# Patient Record
Sex: FEMALE | Race: WHITE | Employment: UNEMPLOYED | URBAN - METROPOLITAN AREA
[De-identification: names, ages, dates, MRNs, and addresses within clinical notes are randomized per-mention and may not be internally consistent; named-entity substitution may affect disease eponyms.]

---

## 2022-05-23 ENCOUNTER — OFFICE VISIT (OUTPATIENT)
Dept: FAMILY MEDICINE CLINIC | Facility: CLINIC | Age: 22
End: 2022-05-23
Payer: COMMERCIAL

## 2022-05-23 VITALS
RESPIRATION RATE: 20 BRPM | HEART RATE: 92 BPM | DIASTOLIC BLOOD PRESSURE: 68 MMHG | WEIGHT: 160 LBS | SYSTOLIC BLOOD PRESSURE: 110 MMHG | HEIGHT: 65 IN | TEMPERATURE: 98 F | BODY MASS INDEX: 26.66 KG/M2

## 2022-05-23 DIAGNOSIS — Z00.00 LABORATORY EXAM ORDERED AS PART OF ROUTINE GENERAL MEDICAL EXAMINATION: ICD-10-CM

## 2022-05-23 DIAGNOSIS — E55.9 VITAMIN D DEFICIENCY: ICD-10-CM

## 2022-05-23 DIAGNOSIS — K08.89 TOOTH PAIN: Primary | ICD-10-CM

## 2022-05-23 DIAGNOSIS — M26.609 TMJ (TEMPOROMANDIBULAR JOINT DISORDER): ICD-10-CM

## 2022-05-23 DIAGNOSIS — Z13.29 SCREENING FOR THYROID DISORDER: ICD-10-CM

## 2022-05-23 PROCEDURE — 99204 OFFICE O/P NEW MOD 45 MIN: CPT | Performed by: STUDENT IN AN ORGANIZED HEALTH CARE EDUCATION/TRAINING PROGRAM

## 2022-05-23 PROCEDURE — 3074F SYST BP LT 130 MM HG: CPT | Performed by: STUDENT IN AN ORGANIZED HEALTH CARE EDUCATION/TRAINING PROGRAM

## 2022-05-23 PROCEDURE — 3008F BODY MASS INDEX DOCD: CPT | Performed by: STUDENT IN AN ORGANIZED HEALTH CARE EDUCATION/TRAINING PROGRAM

## 2022-05-23 PROCEDURE — 3078F DIAST BP <80 MM HG: CPT | Performed by: STUDENT IN AN ORGANIZED HEALTH CARE EDUCATION/TRAINING PROGRAM

## 2022-05-24 ENCOUNTER — LAB ENCOUNTER (OUTPATIENT)
Dept: LAB | Age: 22
End: 2022-05-24
Attending: STUDENT IN AN ORGANIZED HEALTH CARE EDUCATION/TRAINING PROGRAM
Payer: COMMERCIAL

## 2022-05-24 LAB
ALBUMIN SERPL-MCNC: 3.2 G/DL (ref 3.4–5)
ALBUMIN/GLOB SERPL: 0.7 {RATIO} (ref 1–2)
ALP LIVER SERPL-CCNC: 73 U/L
ALT SERPL-CCNC: 13 U/L
ANION GAP SERPL CALC-SCNC: 9 MMOL/L (ref 0–18)
AST SERPL-CCNC: 16 U/L (ref 15–37)
BASOPHILS # BLD AUTO: 0.03 X10(3) UL (ref 0–0.2)
BASOPHILS NFR BLD AUTO: 0.2 %
BILIRUB SERPL-MCNC: 0.3 MG/DL (ref 0.1–2)
BILIRUB UR QL STRIP.AUTO: NEGATIVE
BUN BLD-MCNC: 13 MG/DL (ref 7–18)
CALCIUM BLD-MCNC: 9.3 MG/DL (ref 8.5–10.1)
CHLORIDE SERPL-SCNC: 105 MMOL/L (ref 98–112)
CHOLEST SERPL-MCNC: 175 MG/DL (ref ?–200)
CO2 SERPL-SCNC: 24 MMOL/L (ref 21–32)
COLOR UR AUTO: YELLOW
CREAT BLD-MCNC: 0.76 MG/DL
EOSINOPHIL # BLD AUTO: 0.16 X10(3) UL (ref 0–0.7)
EOSINOPHIL NFR BLD AUTO: 1.3 %
ERYTHROCYTE [DISTWIDTH] IN BLOOD BY AUTOMATED COUNT: 15.2 %
FASTING PATIENT LIPID ANSWER: YES
FASTING STATUS PATIENT QL REPORTED: YES
GLOBULIN PLAS-MCNC: 4.5 G/DL (ref 2.8–4.4)
GLUCOSE BLD-MCNC: 75 MG/DL (ref 70–99)
GLUCOSE UR STRIP.AUTO-MCNC: NEGATIVE MG/DL
HCT VFR BLD AUTO: 38 %
HDLC SERPL-MCNC: 49 MG/DL (ref 40–59)
HGB BLD-MCNC: 11.8 G/DL
IMM GRANULOCYTES # BLD AUTO: 0.08 X10(3) UL (ref 0–1)
IMM GRANULOCYTES NFR BLD: 0.6 %
KETONES UR STRIP.AUTO-MCNC: 20 MG/DL
LDLC SERPL CALC-MCNC: 108 MG/DL (ref ?–100)
LYMPHOCYTES # BLD AUTO: 3.32 X10(3) UL (ref 1.5–5)
LYMPHOCYTES NFR BLD AUTO: 26.3 %
MCH RBC QN AUTO: 26.4 PG (ref 26–34)
MCHC RBC AUTO-ENTMCNC: 31.1 G/DL (ref 31–37)
MCV RBC AUTO: 85 FL
MONOCYTES # BLD AUTO: 0.64 X10(3) UL (ref 0.1–1)
MONOCYTES NFR BLD AUTO: 5.1 %
NEUTROPHILS # BLD AUTO: 8.4 X10 (3) UL (ref 1.5–7.7)
NEUTROPHILS # BLD AUTO: 8.4 X10(3) UL (ref 1.5–7.7)
NEUTROPHILS NFR BLD AUTO: 66.5 %
NITRITE UR QL STRIP.AUTO: NEGATIVE
NONHDLC SERPL-MCNC: 126 MG/DL (ref ?–130)
OSMOLALITY SERPL CALC.SUM OF ELEC: 285 MOSM/KG (ref 275–295)
PH UR STRIP.AUTO: 5 [PH] (ref 5–8)
PLATELET # BLD AUTO: 449 10(3)UL (ref 150–450)
POTASSIUM SERPL-SCNC: 4 MMOL/L (ref 3.5–5.1)
PROT SERPL-MCNC: 7.7 G/DL (ref 6.4–8.2)
PROT UR STRIP.AUTO-MCNC: 30 MG/DL
RBC # BLD AUTO: 4.47 X10(6)UL
SODIUM SERPL-SCNC: 138 MMOL/L (ref 136–145)
SP GR UR STRIP.AUTO: 1.02 (ref 1–1.03)
TRIGL SERPL-MCNC: 97 MG/DL (ref 30–149)
TSI SER-ACNC: 0.87 MIU/ML (ref 0.36–3.74)
UROBILINOGEN UR STRIP.AUTO-MCNC: <2 MG/DL
VIT D+METAB SERPL-MCNC: 29.9 NG/ML (ref 30–100)
VLDLC SERPL CALC-MCNC: 16 MG/DL (ref 0–30)
WBC # BLD AUTO: 12.6 X10(3) UL (ref 4–11)

## 2022-05-24 PROCEDURE — 87086 URINE CULTURE/COLONY COUNT: CPT | Performed by: STUDENT IN AN ORGANIZED HEALTH CARE EDUCATION/TRAINING PROGRAM

## 2022-05-25 DIAGNOSIS — R77.1 ELEVATED SERUM GLOBULIN LEVEL: ICD-10-CM

## 2022-05-25 DIAGNOSIS — D64.9 MILD ANEMIA: ICD-10-CM

## 2022-05-25 DIAGNOSIS — D72.829 LEUKOCYTOSIS, UNSPECIFIED TYPE: ICD-10-CM

## 2022-05-25 DIAGNOSIS — R77.0 LOW SERUM ALBUMIN: Primary | ICD-10-CM

## 2022-11-22 ENCOUNTER — OFFICE VISIT (OUTPATIENT)
Dept: FAMILY MEDICINE CLINIC | Facility: CLINIC | Age: 22
End: 2022-11-22
Payer: COMMERCIAL

## 2022-11-22 ENCOUNTER — PATIENT MESSAGE (OUTPATIENT)
Dept: FAMILY MEDICINE CLINIC | Facility: CLINIC | Age: 22
End: 2022-11-22

## 2022-11-22 VITALS
DIASTOLIC BLOOD PRESSURE: 70 MMHG | BODY MASS INDEX: 26.66 KG/M2 | OXYGEN SATURATION: 98 % | RESPIRATION RATE: 18 BRPM | SYSTOLIC BLOOD PRESSURE: 110 MMHG | TEMPERATURE: 98 F | HEART RATE: 100 BPM | WEIGHT: 160 LBS | HEIGHT: 65 IN

## 2022-11-22 DIAGNOSIS — J31.2 CHRONIC SORE THROAT: Primary | ICD-10-CM

## 2022-11-22 DIAGNOSIS — J02.9 SORE THROAT: Primary | ICD-10-CM

## 2022-11-22 LAB
CONTROL LINE PRESENT WITH A CLEAR BACKGROUND (YES/NO): YES YES/NO
KIT LOT #: NORMAL NUMERIC
STREP GRP A CUL-SCR: NEGATIVE

## 2022-11-22 PROCEDURE — 3078F DIAST BP <80 MM HG: CPT

## 2022-11-22 PROCEDURE — 99213 OFFICE O/P EST LOW 20 MIN: CPT

## 2022-11-22 PROCEDURE — 3008F BODY MASS INDEX DOCD: CPT

## 2022-11-22 PROCEDURE — 87637 SARSCOV2&INF A&B&RSV AMP PRB: CPT

## 2022-11-22 PROCEDURE — 3074F SYST BP LT 130 MM HG: CPT

## 2022-11-22 PROCEDURE — 87880 STREP A ASSAY W/OPTIC: CPT

## 2022-11-23 ENCOUNTER — LAB ENCOUNTER (OUTPATIENT)
Dept: LAB | Age: 22
End: 2022-11-23
Attending: FAMILY MEDICINE
Payer: COMMERCIAL

## 2022-11-23 DIAGNOSIS — J31.2 CHRONIC SORE THROAT: ICD-10-CM

## 2022-11-23 LAB
FLUAV + FLUBV RNA SPEC NAA+PROBE: NOT DETECTED
FLUAV + FLUBV RNA SPEC NAA+PROBE: NOT DETECTED
RSV RNA SPEC NAA+PROBE: NOT DETECTED
SARS-COV-2 RNA RESP QL NAA+PROBE: NOT DETECTED

## 2022-11-23 PROCEDURE — 86665 EPSTEIN-BARR CAPSID VCA: CPT

## 2022-11-23 PROCEDURE — 86664 EPSTEIN-BARR NUCLEAR ANTIGEN: CPT

## 2022-11-23 PROCEDURE — 36415 COLL VENOUS BLD VENIPUNCTURE: CPT

## 2022-11-23 NOTE — TELEPHONE ENCOUNTER
Call to pt-explained info noted below from dr schumacher. Pt confirms already had labs drawn this morning. Advised we will notify her once dr schumacher reviews results. Discussed our altered ofc hours due to holiday but we are in the ofc 11/25/22 morning. Pt also advised dr schumacher is out of ofc until 11/28/22. Patient voices understanding/agrees with plan/no further questions.

## 2022-11-23 NOTE — TELEPHONE ENCOUNTER
I ordered Georges-Barr virus titers. Please let her know that if the IgG is positive it indicates that she has had a past infection with mono and it will be difficult to tell if any of her current symptoms are related to it as typically IgM is converted to IgG within 4 weeks. If IgM is positive, that means that she does have an active monoinfection.

## 2022-11-25 LAB
EBV NA IGG SER QL IA: POSITIVE
EBV VCA IGG SER QL IA: POSITIVE
EBV VCA IGM SER QL IA: NEGATIVE

## 2022-11-25 RX ORDER — NORELGESTROMIN AND ETHINYL ESTRADIOL 150; 35 UG/D; UG/D
1 PATCH TRANSDERMAL WEEKLY
OUTPATIENT
Start: 2022-11-25

## 2023-01-11 ENCOUNTER — OFFICE VISIT (OUTPATIENT)
Dept: FAMILY MEDICINE CLINIC | Facility: CLINIC | Age: 23
End: 2023-01-11
Payer: COMMERCIAL

## 2023-01-11 ENCOUNTER — LAB ENCOUNTER (OUTPATIENT)
Dept: LAB | Age: 23
End: 2023-01-11
Attending: STUDENT IN AN ORGANIZED HEALTH CARE EDUCATION/TRAINING PROGRAM
Payer: COMMERCIAL

## 2023-01-11 VITALS
OXYGEN SATURATION: 98 % | HEART RATE: 75 BPM | SYSTOLIC BLOOD PRESSURE: 102 MMHG | TEMPERATURE: 97 F | HEIGHT: 65 IN | RESPIRATION RATE: 18 BRPM | DIASTOLIC BLOOD PRESSURE: 64 MMHG | BODY MASS INDEX: 27.32 KG/M2 | WEIGHT: 164 LBS

## 2023-01-11 DIAGNOSIS — L71.0 PERIORAL DERMATITIS: ICD-10-CM

## 2023-01-11 DIAGNOSIS — R53.82 CHRONIC FATIGUE: ICD-10-CM

## 2023-01-11 DIAGNOSIS — J30.1 SEASONAL ALLERGIC RHINITIS DUE TO POLLEN: ICD-10-CM

## 2023-01-11 DIAGNOSIS — E55.9 VITAMIN D INSUFFICIENCY: ICD-10-CM

## 2023-01-11 DIAGNOSIS — D64.9 MILD ANEMIA: ICD-10-CM

## 2023-01-11 DIAGNOSIS — R06.83 SNORING: ICD-10-CM

## 2023-01-11 DIAGNOSIS — G47.19 DAYTIME HYPERSOMNOLENCE: Primary | ICD-10-CM

## 2023-01-11 DIAGNOSIS — G47.19 DAYTIME HYPERSOMNOLENCE: ICD-10-CM

## 2023-01-11 LAB
ALBUMIN SERPL-MCNC: 3.9 G/DL (ref 3.4–5)
ALBUMIN/GLOB SERPL: 1 {RATIO} (ref 1–2)
ALP LIVER SERPL-CCNC: 90 U/L
ALT SERPL-CCNC: 15 U/L
ANION GAP SERPL CALC-SCNC: 7 MMOL/L (ref 0–18)
AST SERPL-CCNC: 14 U/L (ref 15–37)
BASOPHILS # BLD AUTO: 0.04 X10(3) UL (ref 0–0.2)
BASOPHILS NFR BLD AUTO: 0.5 %
BILIRUB SERPL-MCNC: 0.4 MG/DL (ref 0.1–2)
BUN BLD-MCNC: 21 MG/DL (ref 7–18)
BUN/CREAT SERPL: 27.6 (ref 10–20)
CALCIUM BLD-MCNC: 9.7 MG/DL (ref 8.5–10.1)
CHLORIDE SERPL-SCNC: 102 MMOL/L (ref 98–112)
CO2 SERPL-SCNC: 29 MMOL/L (ref 21–32)
CREAT BLD-MCNC: 0.76 MG/DL
DEPRECATED RDW RBC AUTO: 48.1 FL (ref 35.1–46.3)
EOSINOPHIL # BLD AUTO: 0.22 X10(3) UL (ref 0–0.7)
EOSINOPHIL NFR BLD AUTO: 2.6 %
ERYTHROCYTE [DISTWIDTH] IN BLOOD BY AUTOMATED COUNT: 15.4 % (ref 11–15)
FASTING STATUS PATIENT QL REPORTED: YES
GFR SERPLBLD BASED ON 1.73 SQ M-ARVRAT: 115 ML/MIN/1.73M2 (ref 60–?)
GLOBULIN PLAS-MCNC: 3.8 G/DL (ref 2.8–4.4)
GLUCOSE BLD-MCNC: 79 MG/DL (ref 70–99)
HCT VFR BLD AUTO: 38.4 %
HGB BLD-MCNC: 11.9 G/DL
IMM GRANULOCYTES # BLD AUTO: 0.02 X10(3) UL (ref 0–1)
IMM GRANULOCYTES NFR BLD: 0.2 %
LYMPHOCYTES # BLD AUTO: 3.08 X10(3) UL (ref 1–4)
LYMPHOCYTES NFR BLD AUTO: 37.1 %
MCH RBC QN AUTO: 26.4 PG (ref 26–34)
MCHC RBC AUTO-ENTMCNC: 31 G/DL (ref 31–37)
MCV RBC AUTO: 85.1 FL
MONOCYTES # BLD AUTO: 0.61 X10(3) UL (ref 0.1–1)
MONOCYTES NFR BLD AUTO: 7.3 %
NEUTROPHILS # BLD AUTO: 4.34 X10 (3) UL (ref 1.5–7.7)
NEUTROPHILS # BLD AUTO: 4.34 X10(3) UL (ref 1.5–7.7)
NEUTROPHILS NFR BLD AUTO: 52.3 %
OSMOLALITY SERPL CALC.SUM OF ELEC: 288 MOSM/KG (ref 275–295)
PLATELET # BLD AUTO: 447 10(3)UL (ref 150–450)
POTASSIUM SERPL-SCNC: 4.7 MMOL/L (ref 3.5–5.1)
PROT SERPL-MCNC: 7.7 G/DL (ref 6.4–8.2)
RBC # BLD AUTO: 4.51 X10(6)UL
SODIUM SERPL-SCNC: 138 MMOL/L (ref 136–145)
T4 FREE SERPL-MCNC: 1.4 NG/DL (ref 0.8–1.7)
TSI SER-ACNC: 1.07 MIU/ML (ref 0.36–3.74)
VIT D+METAB SERPL-MCNC: 45.2 NG/ML (ref 30–100)
WBC # BLD AUTO: 8.3 X10(3) UL (ref 4–11)

## 2023-01-11 PROCEDURE — 85025 COMPLETE CBC W/AUTO DIFF WBC: CPT

## 2023-01-11 PROCEDURE — 84443 ASSAY THYROID STIM HORMONE: CPT

## 2023-01-11 PROCEDURE — 3078F DIAST BP <80 MM HG: CPT | Performed by: STUDENT IN AN ORGANIZED HEALTH CARE EDUCATION/TRAINING PROGRAM

## 2023-01-11 PROCEDURE — 80053 COMPREHEN METABOLIC PANEL: CPT

## 2023-01-11 PROCEDURE — 82306 VITAMIN D 25 HYDROXY: CPT

## 2023-01-11 PROCEDURE — 84439 ASSAY OF FREE THYROXINE: CPT

## 2023-01-11 PROCEDURE — 99214 OFFICE O/P EST MOD 30 MIN: CPT | Performed by: STUDENT IN AN ORGANIZED HEALTH CARE EDUCATION/TRAINING PROGRAM

## 2023-01-11 PROCEDURE — 3008F BODY MASS INDEX DOCD: CPT | Performed by: STUDENT IN AN ORGANIZED HEALTH CARE EDUCATION/TRAINING PROGRAM

## 2023-01-11 PROCEDURE — 3074F SYST BP LT 130 MM HG: CPT | Performed by: STUDENT IN AN ORGANIZED HEALTH CARE EDUCATION/TRAINING PROGRAM

## 2023-03-15 ENCOUNTER — OFFICE VISIT (OUTPATIENT)
Dept: FAMILY MEDICINE CLINIC | Facility: CLINIC | Age: 23
End: 2023-03-15
Payer: COMMERCIAL

## 2023-03-15 VITALS
HEIGHT: 65 IN | WEIGHT: 160 LBS | TEMPERATURE: 100 F | BODY MASS INDEX: 26.66 KG/M2 | OXYGEN SATURATION: 99 % | HEART RATE: 85 BPM | RESPIRATION RATE: 18 BRPM | SYSTOLIC BLOOD PRESSURE: 114 MMHG | DIASTOLIC BLOOD PRESSURE: 72 MMHG

## 2023-03-15 DIAGNOSIS — J06.9 VIRAL URI WITH COUGH: Primary | ICD-10-CM

## 2023-03-15 DIAGNOSIS — J02.0 STREP THROAT: ICD-10-CM

## 2023-03-15 PROCEDURE — 3078F DIAST BP <80 MM HG: CPT | Performed by: NURSE PRACTITIONER

## 2023-03-15 PROCEDURE — 99213 OFFICE O/P EST LOW 20 MIN: CPT | Performed by: NURSE PRACTITIONER

## 2023-03-15 PROCEDURE — 3074F SYST BP LT 130 MM HG: CPT | Performed by: NURSE PRACTITIONER

## 2023-03-15 PROCEDURE — 3008F BODY MASS INDEX DOCD: CPT | Performed by: NURSE PRACTITIONER

## 2023-03-15 RX ORDER — BENZONATATE 200 MG/1
200 CAPSULE ORAL 3 TIMES DAILY PRN
Qty: 20 CAPSULE | Refills: 0 | Status: SHIPPED | OUTPATIENT
Start: 2023-03-15

## 2023-03-15 RX ORDER — FLUTICASONE PROPIONATE 50 MCG
2 SPRAY, SUSPENSION (ML) NASAL DAILY
Qty: 1 EACH | Refills: 0 | Status: SHIPPED | OUTPATIENT
Start: 2023-03-15 | End: 2023-04-14

## 2023-03-15 RX ORDER — AMOXICILLIN 875 MG/1
TABLET, COATED ORAL
COMMUNITY
Start: 2023-03-09

## 2023-03-16 ENCOUNTER — OFFICE VISIT (OUTPATIENT)
Dept: SLEEP CENTER | Age: 23
End: 2023-03-16
Attending: STUDENT IN AN ORGANIZED HEALTH CARE EDUCATION/TRAINING PROGRAM
Payer: COMMERCIAL

## 2023-03-16 DIAGNOSIS — G47.19 DAYTIME HYPERSOMNOLENCE: ICD-10-CM

## 2023-03-16 DIAGNOSIS — R06.83 SNORING: ICD-10-CM

## 2023-03-16 PROCEDURE — 95810 POLYSOM 6/> YRS 4/> PARAM: CPT

## 2023-03-29 ENCOUNTER — TELEPHONE (OUTPATIENT)
Dept: FAMILY MEDICINE CLINIC | Facility: CLINIC | Age: 23
End: 2023-03-29

## 2023-03-29 DIAGNOSIS — G47.10 HYPERSOMNIA: Primary | ICD-10-CM

## 2023-03-29 NOTE — TELEPHONE ENCOUNTER
Pt called twice for sleep study result. States she has gotten result or charts already on her MyChart and was calling for result.

## 2023-03-29 NOTE — TELEPHONE ENCOUNTER
I just reviewed the results. No diagnosis of sleep apnea. The final diagnosis was hypersomnia, this is nonspecific. I recommend follow up with neurology, Dr. Vipul Duncna.

## 2023-03-30 NOTE — TELEPHONE ENCOUNTER
Pt called back. I discussed the results of her sleep study with her. Dr. Argelia Garcia is recommending she follow up with neurology, Dr. Hilda Franklin. His contact information was given to pt. Referral placed.  Pt. Agreed to plan and verbalized understanding

## 2023-08-02 ENCOUNTER — OFFICE VISIT (OUTPATIENT)
Dept: NEUROLOGY | Facility: CLINIC | Age: 23
End: 2023-08-02
Payer: COMMERCIAL

## 2023-08-02 VITALS
RESPIRATION RATE: 14 BRPM | DIASTOLIC BLOOD PRESSURE: 56 MMHG | BODY MASS INDEX: 27 KG/M2 | SYSTOLIC BLOOD PRESSURE: 106 MMHG | HEART RATE: 72 BPM | WEIGHT: 161.19 LBS

## 2023-08-02 DIAGNOSIS — G47.10 HYPERSOMNIA: Primary | ICD-10-CM

## 2023-08-02 DIAGNOSIS — G43.009 MIGRAINE WITHOUT AURA AND WITHOUT STATUS MIGRAINOSUS, NOT INTRACTABLE: ICD-10-CM

## 2023-08-02 PROCEDURE — 99204 OFFICE O/P NEW MOD 45 MIN: CPT | Performed by: OTHER

## 2023-08-02 PROCEDURE — 3074F SYST BP LT 130 MM HG: CPT | Performed by: OTHER

## 2023-08-02 PROCEDURE — 3078F DIAST BP <80 MM HG: CPT | Performed by: OTHER

## 2023-08-02 RX ORDER — DOXYCYCLINE HYCLATE 20 MG
30 TABLET ORAL DAILY PRN
COMMUNITY

## 2023-08-02 RX ORDER — NORELGESTROMIN AND ETHINYL ESTRADIOL 35; 150 UG/D; UG/D
PATCH TRANSDERMAL
COMMUNITY
Start: 2023-02-20

## 2023-08-02 NOTE — PROGRESS NOTES
Pt reports she noticed difficulty with daily fatigue since starting college. Pt reports if she does not force herself to get out of bed she can sleep all day. Denies episodes of falling asleep while studying or while watching TV etc, but notes if she lays down during the day she will sleep. Pt reports when she wakes up from naps she will usually have a headache - 2 times per week.

## 2023-08-02 NOTE — H&P
Neurology H&P    Brittney Rouse Patient Status:  No patient class for patient encounter    2000 MRN [id number]   Location [facility], [facility] Attending No att. providers found   Hosp Day # 0 PCP Elsy Garay MD     Subjective:  Brittney Rouse is a(n) 23year old female who comes to see me for sleepiness. She states that since going to college she has become more sleepy. She states that she is tired all day. She sleeps 8 hours per night. She goes to sleep at ~2230 and wakes at ~0645. She drinks 1 cup coffee per day. She had a sleep study which was grossly unremarkable. No seizures seen. She has a diagnosis of hypersomnia however. She does not just fall asleep out of the blue. No unexpected episodes of LOC or AMS. No FH of sleep disorders that have been diagnosed but her mother also is frequently sleepy. She denies any numbness weakness or tingling. Current Medications:  Current Outpatient Medications   Medication Sig Dispense Refill    ZAFEMY 150-35 MCG/24HR Transdermal Patch Weekly       Doxycycline Hyclate 20 MG Oral Tab Take 1.5 tablets (30 mg total) by mouth daily as needed. doxycycline in sodium chloride 0.9% 500 mg/50mL Intrapleural Solution       busPIRone 5 MG Oral Tab Take 1 tablet (5 mg total) by mouth every 12 (twelve) hours as needed. DULoxetine 30 MG Oral Cap DR Particles Take 1 capsule (30 mg total) by mouth daily. Problem List:  Patient Active Problem List:     Hypersomnia      PMHx:  Past Medical History:   Diagnosis Date    Anxiety 18    Depression 18       PSHx:  No past surgical history on file.     SocHx:  Social History     Socioeconomic History    Marital status: Single   Tobacco Use    Smoking status: Never    Smokeless tobacco: Never   Vaping Use    Vaping Use: Never used   Substance and Sexual Activity    Alcohol use: Not Currently    Drug use: Never   Other Topics Concern    Caffeine Concern Yes     Comment: 1 coffee per day    Stress Concern No    Weight Concern No    Special Diet No    Exercise Yes     Comment: 5 days per week    Seat Belt Yes       Family History:  Family History   Problem Relation Age of Onset    Diabetes Maternal Grandmother     Diabetes Maternal Grandfather     Diabetes Maternal Uncle     Diabetes Maternal Aunt     Depression Mother            ROS:  10 point ROS completed and was negative, except for pertinent positive and negatives stated in subjective. Objective/Physical Exam:    Vital Signs:  Blood pressure 106/56, pulse 72, resp. rate 14, weight 161 lb 2.5 oz (73.1 kg), last menstrual period 02/08/2023. Gen: Awake and in no apparent distress  HEENT: moist mucus membranes  Neck: Supple  Cardiovascular: Regular rate and rhythm, no murmur  Pulm: CTAB  GI: non-tender, normal bowel sounds  Skin: normal, dry  Extremities: No clubbing or cyanosis      Neurologic:   MENTAL STATUS: alert, ox3, normal attention, language and fund of knowledge. CRANIAL NERVES II to XII: PERRLA, no ptosis or diplopia, EOM intact, facial sensation intact, strong eye closure, face is symmetric, no dysarthria, tongue midline,  no tongue fasciculations or atrophy, strong shoulder shrug. MOTOR EXAMINATION: normal tone, no fasciculations, normal strength throughout in UEs and LEs      SENSORY EXAMINATION:  UE: intact to light touch, pinprick intact  LE: intact to light touch, pinprick intact    COORDINATION:  No dysmetria, or intention tremors     REFLEXES: 2+ at biceps, 2+ brachioradialis, 2+ at patella, 2+ at the ankles. GAIT: normal stance, normal gait      Labs:       Imaging:  No CNS imaging to review    Assessment: This is a 22 y/o female with reports of chronic lethargy. Her labs and sleep study were reviewed and grossly unremarkable. She has a normal exam. She does not describe any sudden uncontrollable episodes of sleep, just a chronic lethargy. It does not clinically sound like a primary hypersomnia. Secondary hypersomnia is a possibility (cymbalta or anxiety/depression/stress?) as this was never a problem prior to college. She also reports a mother with similar symptoms. I will refer her to a sleep specialist. I can get an MRI of the brain as she also reports chronic migraines. She declines any medications for migraine at this time I encouraged proper sleep hygiene. Avoid caffeine later in the after non and prior to bedtime. Try to go to sleep and wake at the same time every day. Avoid tobacco and EtOH. Plan:  Chronic Lethargy  - MRI Brain   - Sleep study was grossly unremarkable  - Referral to a sleep specialist  - B12, B6    2.  Migraines  - Declines any medicatiosn at this time    Ricardo Madrid, DO  Neurology

## 2023-08-05 ENCOUNTER — LAB ENCOUNTER (OUTPATIENT)
Dept: LAB | Age: 23
End: 2023-08-05
Attending: Other
Payer: COMMERCIAL

## 2023-08-05 DIAGNOSIS — G47.10 HYPERSOMNIA: ICD-10-CM

## 2023-08-05 LAB — VIT B12 SERPL-MCNC: 483 PG/ML (ref 193–986)

## 2023-08-05 PROCEDURE — 36415 COLL VENOUS BLD VENIPUNCTURE: CPT

## 2023-08-05 PROCEDURE — 84207 ASSAY OF VITAMIN B-6: CPT

## 2023-08-05 PROCEDURE — 82607 VITAMIN B-12: CPT

## 2023-08-10 LAB — VITAMIN B6: 33.8 UG/L

## 2023-08-14 ENCOUNTER — OFFICE VISIT (OUTPATIENT)
Facility: CLINIC | Age: 23
End: 2023-08-14
Payer: COMMERCIAL

## 2023-08-14 VITALS
SYSTOLIC BLOOD PRESSURE: 100 MMHG | HEART RATE: 70 BPM | OXYGEN SATURATION: 98 % | BODY MASS INDEX: 26.99 KG/M2 | WEIGHT: 162 LBS | DIASTOLIC BLOOD PRESSURE: 58 MMHG | HEIGHT: 65 IN | RESPIRATION RATE: 14 BRPM

## 2023-08-14 DIAGNOSIS — R53.83 FATIGUE, UNSPECIFIED TYPE: Primary | ICD-10-CM

## 2023-08-14 DIAGNOSIS — F32.A DEPRESSION, UNSPECIFIED DEPRESSION TYPE: ICD-10-CM

## 2023-08-14 NOTE — PROGRESS NOTES
This is a 23year old female who presents with the following symptoms, risk factors, behaviors or other items associated with sleep problems. Sleep Apnea:   No data recorded  Insomnia:  No data recorded  Restless Leg:  No data recorded  Parasomnias:   No data recorded  Daytime Problems:  No data recorded    The patient's [place] Sleepiness score is 8/24.

## 2023-11-22 ENCOUNTER — OFFICE VISIT (OUTPATIENT)
Dept: FAMILY MEDICINE CLINIC | Facility: CLINIC | Age: 23
End: 2023-11-22
Payer: COMMERCIAL

## 2023-11-22 VITALS
TEMPERATURE: 98 F | HEART RATE: 77 BPM | DIASTOLIC BLOOD PRESSURE: 74 MMHG | OXYGEN SATURATION: 98 % | HEIGHT: 65 IN | BODY MASS INDEX: 26.66 KG/M2 | WEIGHT: 160 LBS | SYSTOLIC BLOOD PRESSURE: 122 MMHG | RESPIRATION RATE: 18 BRPM

## 2023-11-22 DIAGNOSIS — H65.192 ACUTE MEE (MIDDLE EAR EFFUSION), LEFT: Primary | ICD-10-CM

## 2023-11-22 PROCEDURE — 3078F DIAST BP <80 MM HG: CPT | Performed by: NURSE PRACTITIONER

## 2023-11-22 PROCEDURE — 99213 OFFICE O/P EST LOW 20 MIN: CPT | Performed by: NURSE PRACTITIONER

## 2023-11-22 PROCEDURE — 3074F SYST BP LT 130 MM HG: CPT | Performed by: NURSE PRACTITIONER

## 2023-11-22 PROCEDURE — 3008F BODY MASS INDEX DOCD: CPT | Performed by: NURSE PRACTITIONER

## 2023-11-22 RX ORDER — METHYLPREDNISOLONE 4 MG/1
TABLET ORAL
Qty: 1 EACH | Refills: 0 | Status: SHIPPED | OUTPATIENT
Start: 2023-11-22

## 2024-06-14 ENCOUNTER — OFFICE VISIT (OUTPATIENT)
Dept: OBGYN CLINIC | Facility: CLINIC | Age: 24
End: 2024-06-14
Payer: COMMERCIAL

## 2024-06-14 VITALS
HEIGHT: 65 IN | WEIGHT: 162.5 LBS | HEART RATE: 93 BPM | DIASTOLIC BLOOD PRESSURE: 78 MMHG | BODY MASS INDEX: 27.07 KG/M2 | SYSTOLIC BLOOD PRESSURE: 122 MMHG

## 2024-06-14 DIAGNOSIS — Z30.011 INITIATION OF ORAL CONTRACEPTION: ICD-10-CM

## 2024-06-14 DIAGNOSIS — Z12.4 SCREENING FOR CERVICAL CANCER: ICD-10-CM

## 2024-06-14 DIAGNOSIS — Z01.419 WELL WOMAN EXAM WITH ROUTINE GYNECOLOGICAL EXAM: Primary | ICD-10-CM

## 2024-06-14 DIAGNOSIS — N92.6 IRREGULAR MENSES: ICD-10-CM

## 2024-06-14 LAB
CONTROL LINE PRESENT WITH A CLEAR BACKGROUND (YES/NO): YES YES/NO
KIT LOT #: NORMAL NUMERIC
PREGNANCY TEST, URINE: NEGATIVE

## 2024-06-14 PROCEDURE — 3074F SYST BP LT 130 MM HG: CPT | Performed by: NURSE PRACTITIONER

## 2024-06-14 PROCEDURE — 88175 CYTOPATH C/V AUTO FLUID REDO: CPT | Performed by: NURSE PRACTITIONER

## 2024-06-14 PROCEDURE — 99395 PREV VISIT EST AGE 18-39: CPT | Performed by: NURSE PRACTITIONER

## 2024-06-14 PROCEDURE — 3008F BODY MASS INDEX DOCD: CPT | Performed by: NURSE PRACTITIONER

## 2024-06-14 PROCEDURE — 99213 OFFICE O/P EST LOW 20 MIN: CPT | Performed by: NURSE PRACTITIONER

## 2024-06-14 PROCEDURE — 81025 URINE PREGNANCY TEST: CPT | Performed by: NURSE PRACTITIONER

## 2024-06-14 PROCEDURE — 3078F DIAST BP <80 MM HG: CPT | Performed by: NURSE PRACTITIONER

## 2024-06-14 NOTE — PATIENT INSTRUCTIONS
Instructions for Birth Control Pill Use    The birth control pill works primarily by blocking ovulation (release of an egg).  If there is no egg to meet the sperm, pregnancy cannot occur.  The pill also works by making cervical mucous thick and unreceptive to sperm, slowing tubal function which has to move the egg down the tube to meet the sperm.    For women who follow these directions carefully, the pill is an effective reversible contraceptive currently available.    Starting birth control pills for the first time  1). Choose a backup method of birth control (such as condoms, diaphragm or foam) to use with your first pack of pills because the pill may not fully protect you from pregnancy during the first two weeks.  Keep this backup method handy and use it in case you:  Run out of pills  Forget to take your pill  Discontinue pill use  The use of condoms is ALWAYS encouraged to protect against sexually transmitted diseases, if applicable.   2). There are several ways to start taking your pills. Use one of the following approaches:  First day of period start: Start your first pack of pills on the day of your period. No back-up method is required  Sunday start: Start your first pack of pills on the first Sunday after your period begins.  This will result in your menses almost always beginning on a Tuesday or a Wednesday every 4 weeks.  You will need a backup method for 14 days.  Quick Start: Start immediately if pregnancy is excluded. You will need a back-up method for 14 days.  Quick start will cause your period to be irregular.  3). Take one pill a day until you finish the pack.   If you are using a 28-day pack, begin a new pack immediately. Skip no days between packages  If you are using a 21-day pack, stop taking pills for 1 week and then start your new pack   4). Try to associate taking your pill with something you do around the same time every day, like brushing your teeth in the morning, eating a meal or  going to bed.  Establishing a routine will make it easier for you to remember. The pill works best if you take it about the same time every day.    Continuing on the pills ~ What if……  1). If you have bleeding between periods  This is a very common side effect of birth control pills for the first 3 months.  Spotting (light bleeding between periods) can also occur if you miss a pill.  Call the doctor’s office for advice if bleeding is heavier than 1 pad an hour or the bleeding between periods last for more than 3 months  2). If you have nausea or vomiting  Nausea and vomiting may occur during the first few months of taking birth control pills.  Sometimes by changing the time of the day when you take the pill will help.  Try switching to dinner time or before bed.  If you had episodes of vomiting within 2 hours of taking your pill, please use a back-up plan for the rest of the cycle because your body may not absorbed the pill.  Contact your doctor’s office if you have persistent nausea and vomiting.  3). If you miss your period  It is not uncommon for your periods to become lighter while taking birth control pills or miss you period all together.  If you missed any pills in the pack prior to this occurring, you should take a home pregnancy test prior to starting a new pack.  4). If you forget your pills for a day or two follow the instructions below:  If you miss a pill, take the forgotten one (yesterday’s pill) as soon as you remember it and take today’s pill at the regular time.  Although you probably will not become pregnant, use a back-up method until your next period.  If you miss two pills in a row, take two pills as soon as you remember and two pills the next day.  You may have some spotting and nausea.  Please use a back-up method until your next period.  If you miss three or more pills in a row, do not take all 3 pills at once.  If you are in the third week of pills, finish the pack and skip the inactive  pills and start a new pack.  Start your backup method of birth control immediately.  You are at risk for becoming pregnant if you do not use a backup contraception.    Remember, missed pills may cause you to start spotting or bleeding for about 7 days.    5). If you experience breast soreness, mild headaches, mild edema (swelling)  These symptoms are usually mild and will improve after being on the pill for 3 or more months.  If any of these symptoms are severe or persist more than 3 months, you should contact our office.  6). If you experience mood swings or changes  Usually, after you adjust to the hormones, these symptoms will improve.  If at any time these symptoms are severe or persistent, you should contact our office.    7). If your doctor has you on continuous pills (No 7 day break after 21 days of active pills) in order to suppress your menses because of endometriosis or premenstrual syndrome, you will likely have break through bleeding.  If the spotting persists through more than 3 packs of pills, contact your doctor to confirm that you should stay on that brand of pills    8). If you need to take any other medications, including antibiotics, check with the pharmacist to see if there will be any interaction or if it will make your birth control pill less effective.      When to contact your provider  If you are having severe abdominal pain  Chest pain and shortness of breath  Severe Headaches  Blurred Vision or Vision Loss  Severe leg pain- calf or thigh    If you have additional questions or concerns, please call us at [phone]

## 2024-06-14 NOTE — PROGRESS NOTES
Subjective:  Chief Complaint   Patient presents with    Annual     23 year old female  presents for annual.    Pt also with complaints of irregular menses  Has amenorrhea for 5 months, saw student health service at school and was prescribed progesterone   Pt did get menses but bleeding lasted over 1 month  They also tested hormones and noted that the testosterone is elevated  Denies personal/family history of blood clots  Notes migraine, denies aura  Denies smoking  Has been on OCP in the past    Patient's last menstrual period was 05/15/2024 (exact date).  Pap Result Notes: Never had a pap  Menarche: 16 (2024  2:35 PM)  Period Cycle (Days): Irregular (2024  2:35 PM)  Period Duration (Days): 10 days (2024  2:35 PM)  Period Flow: Heavy (2024  2:35 PM)  Use of Birth Control (if yes, specify type): None (2024  2:35 PM)  Pap Result Notes: Never had a pap (2024  2:35 PM)    Denies family history of breast, ovarian and colon CA.  Not sexually active  Feeling safe at home.    There is no immunization history on file for this patient.   reports that she has never smoked. She has never used smokeless tobacco.   reports current alcohol use of about 4.0 - 5.0 standard drinks of alcohol per week.    Past Medical History:    Allergic rhinitis    Anemia    Anxiety    Asthma (HCC)    Depression    Pneumonia due to organism     History reviewed. No pertinent surgical history.    Review of Systems:  Pertinent items are noted in the HPI.    Objective:  /78   Pulse 93   Ht 65\"   Wt 162 lb 8 oz (73.7 kg)   LMP 05/15/2024 (Exact Date)   BMI 27.04 kg/m²    Physical Examination:  General appearance: Well dressed, well nourished in no apparent distress  Neurologic/Psychiatric: Alert and oriented to person, place and time, mood normal, affect appropriate  Head: Normocephalic without obvious deformity, atraumatic  Neck: No thyromegaly, supple, non-tender, no masses, no adenopathy  Lungs:  Clear to auscultation bilaterally, no rales, wheezes or rhonchi  Breasts: Symmetric, non-tender, no masses, lesions, retraction, dimpling or discharge bilaterally, no axillary or supraclavicular lymphadenopathy  Heart: Regular rate and rhythm, no gallops or murmurs  Abdomen: Soft, non-tender, non-distended, no masses, no hepatosplenomegaly, no hernias, no inguinal lymphadenopathy  Pelvic:    External genitalia- Normal, Bartholin's, urethra, skeins glands normal   Vagina- No vaginal lesions, physiologic discharge   Cervix- No lesions, long/closed, no cervical motion tenderness   Uterus- Normal sized, non-tender, no masses   Adnexa-  Non-tender, no masses  Extremities: Non-tender, full range of motion, no clubbing, cyanosis or edema  Skin:  General inspection- no rashes, lesions or discoloration  Pap smear done    Assessment/Plan:  Normal well-woman exam.  Declines STD screen  Pap smear obtained.    Patient offered chaperone for exam, declined    Diagnoses and all orders for this visit:    Well woman exam with routine gynecological exam  - self breast exam discussed ans encouraged    Screening for cervical cancer  -     ThinPrep PAP with HPV Reflex Request B; Future  -     Image-Guided Pap Smear ([facility])    Irregular menses  - discussed irregular menstrual bleeding cycle  - discussed PCOS   - we dicussed treatment of irregular bleeding and PCOS and overlap  - encouraged healthy diet and lifestyle  - discussed hormone contraceptive for regulation of menstrual cycle and endometrial protection    Initiation of oral contraception  -     Urine Preg Test [16965]- negative  - no contraindication  -     Drospirenone-Ethinyl Estradiol (FREDDIE) 3-0.02 MG Oral Tab; Take 1 tablet by mouth daily.  - VTE aware  - condoms with sexual encounters        Return in about 6 months (around 12/14/2024) for medication management.  Pt is unable to return in 3 months d/t college, she will be home for Thanksgiving break    E/M added for  irregular menstrual bleeding

## 2024-06-16 RX ORDER — DROSPIRENONE AND ETHINYL ESTRADIOL 0.02-3(28)
1 KIT ORAL DAILY
Qty: 84 TABLET | Refills: 3 | Status: SHIPPED | OUTPATIENT
Start: 2024-06-16 | End: 2025-06-16

## 2024-06-19 LAB
.: NORMAL
.: NORMAL

## 2024-07-31 ENCOUNTER — TELEPHONE (OUTPATIENT)
Dept: FAMILY MEDICINE CLINIC | Facility: CLINIC | Age: 24
End: 2024-07-31

## 2024-07-31 NOTE — TELEPHONE ENCOUNTER
Pt would like referral to DR Garcia for TMJ.   Phone [phone].   Fax [phone]  She has been seen for this before. If she needs appt she would like to know if it can be virtual. She is at school. Please advise. Thank you.

## 2024-09-05 ENCOUNTER — TELEMEDICINE (OUTPATIENT)
Dept: FAMILY MEDICINE CLINIC | Facility: CLINIC | Age: 24
End: 2024-09-05
Payer: COMMERCIAL

## 2024-09-05 DIAGNOSIS — M26.609 TMJ (TEMPOROMANDIBULAR JOINT DISORDER): ICD-10-CM

## 2024-09-05 DIAGNOSIS — Z30.011 INITIATION OF ORAL CONTRACEPTION: ICD-10-CM

## 2024-09-05 DIAGNOSIS — M26.639 TMJ DISORDER INVOLVING ARTICULAR DISC ABNORMALITY: ICD-10-CM

## 2024-09-05 PROCEDURE — 99214 OFFICE O/P EST MOD 30 MIN: CPT | Performed by: STUDENT IN AN ORGANIZED HEALTH CARE EDUCATION/TRAINING PROGRAM

## 2024-09-05 RX ORDER — NAPROXEN 500 MG/1
500 TABLET ORAL 2 TIMES DAILY PRN
Qty: 60 TABLET | Refills: 0 | Status: SHIPPED | OUTPATIENT
Start: 2024-09-05

## 2024-09-05 RX ORDER — CYCLOBENZAPRINE HCL 5 MG
TABLET ORAL 2 TIMES DAILY PRN
Qty: 30 TABLET | Refills: 0 | Status: SHIPPED | OUTPATIENT
Start: 2024-09-05

## 2024-09-05 NOTE — PROGRESS NOTES
Subjective     HPI:   Usha Rouse verbally consents to a Virtual/Telephone Check-In service on 09/05/24. Patient understands and accepts financial responsibility for any deductible, co-insurance and/or co-pays associated with this service. This visit is conducted using Telemedicine with live, interactive video and audio.     I returned Usha Rouse call by secure video chat, verified date of birth, and discussed their current concerns:     Three years ago had flar of TMJ. Had jaw locked. Saw TMJ specialist. Had cortisone shot and splint. Was on muscle relaxers and high dose ibuprofen. Had MRI which showed worsening displacement on the left but on the right as well. Was referred for targeted procedure, arthrocentesis. Started to flare up again since June 2024. Now daily.  Tried some muscle relaxers and ibuprofen.    No fevers or rash.    Swallowing normal.    No locking. It is painful and starting to get migraines.    Studying early childhood education.       No Further Nursing Notes to Review  Tobacco Reviewed  Allergies   Reviewed  Medications Reviewed  Problem List Reviewed  Medical History   Reviewed  Surgical History Reviewed  Family History Reviewed            REVIEW OF SYSTEMS:  Pertinent items are noted in HPI.             Physical Exam:  Patient is well appearing, speaking in full sentences, nonlabored breathing, no abnormal pigmentation, logical connections.         Assessment    Diagnoses and all orders for this visit:    TMJ disorder involving articular disc abnormality  -     Specialty Other Referral - External  -     cyclobenzaprine 5 MG Oral Tab; Take 1-2 tablets (5-10 mg total) by mouth 2 (two) times daily as needed for Muscle spasms.  -     naproxen 500 MG Oral Tab; Take 1 tablet (500 mg total) by mouth 2 (two) times daily as needed (pain).    TMJ (temporomandibular joint disorder)  -     Specialty Other Referral - External  -     cyclobenzaprine 5 MG Oral Tab; Take 1-2 tablets (5-10 mg  total) by mouth 2 (two) times daily as needed for Muscle spasms.  -     naproxen 500 MG Oral Tab; Take 1 tablet (500 mg total) by mouth 2 (two) times daily as needed (pain).       Patient presents for follow-up of TMJ disorder.  She reports history of disc abnormality requiring arthrodesis by Dr. Garcia in 2021.  Will refer back to Dr. Little, appreciate evaluation recommendations.  Will start cyclobenzaprine 5 to 10 mg twice daily as needed, no driving or operating heavy machinery while taking this medication.  Will also trial naproxen 500 mg twice daily as needed, take with food and plenty of water.  Cannot take other NSAIDs such as ibuprofen with naproxen, however, can take Tylenol as needed 500 to 625 mg every 6 hours as needed in addition to the naproxen.  Follow-up for annual physical or sooner if needed.    Patient/Caregiver Education: There are no barriers to learning. Medical education done.   Outcome: Patient verbalizes understanding. Patient is notified to call with any questions, complications, allergies, or worsening or changing symptoms.  Patient is to call with any side effects or complications from the treatments as a result of today.     Follow up: for annual physical  Time of visit: 10 minutes        Rossi Bernal MD  [facility]  09/05/24    Please note that portions of this note may have been completed with a voice recognition program. Efforts were made to edit the dictations but occasionally words are mis-transcribed. Thank you for your understanding.    The 21st Century Cures Act makes medical notes like these available to patients in the interest of transparency. Please be advised this is a medical document. Medical documents are intended to carry relevant information, facts as evident, and the clinical opinion of the practitioner. The medical note is intended as peer to peer communication and may appear blunt or direct. It is written in medical language and  may contain abbreviations or verbiage that are unfamiliar. If there are any questions or concerns please contact the provider for clarification.

## 2024-09-06 RX ORDER — DROSPIRENONE AND ETHINYL ESTRADIOL 0.02-3(28)
1 KIT ORAL DAILY
Qty: 84 TABLET | Refills: 1 | Status: SHIPPED | OUTPATIENT
Start: 2024-09-06 | End: 2025-09-06

## 2024-09-06 NOTE — TELEPHONE ENCOUNTER
Last OV: 6/14/24 ABRAHAM Foster   Last Refill Date: 6/16/24 #84 3 refills   Follow Up:  6 months 12/2024  Next Appt. No appointment yet.     Refill sent to new pharmacy per pt's request.

## 2024-11-29 ENCOUNTER — OFFICE VISIT (OUTPATIENT)
Dept: OBGYN CLINIC | Facility: CLINIC | Age: 24
End: 2024-11-29
Payer: COMMERCIAL

## 2024-11-29 VITALS
BODY MASS INDEX: 26.85 KG/M2 | WEIGHT: 161.13 LBS | HEIGHT: 65 IN | SYSTOLIC BLOOD PRESSURE: 120 MMHG | HEART RATE: 83 BPM | DIASTOLIC BLOOD PRESSURE: 76 MMHG

## 2024-11-29 DIAGNOSIS — N94.10 PAIN IN FEMALE GENITALIA ON INTERCOURSE: ICD-10-CM

## 2024-11-29 DIAGNOSIS — N39.46 MIXED INCONTINENCE: Primary | ICD-10-CM

## 2024-11-29 DIAGNOSIS — N92.6 IRREGULAR MENSES: ICD-10-CM

## 2024-11-29 PROCEDURE — 3078F DIAST BP <80 MM HG: CPT | Performed by: NURSE PRACTITIONER

## 2024-11-29 PROCEDURE — 3008F BODY MASS INDEX DOCD: CPT | Performed by: NURSE PRACTITIONER

## 2024-11-29 PROCEDURE — 99214 OFFICE O/P EST MOD 30 MIN: CPT | Performed by: NURSE PRACTITIONER

## 2024-11-29 PROCEDURE — 3074F SYST BP LT 130 MM HG: CPT | Performed by: NURSE PRACTITIONER

## 2024-11-29 RX ORDER — PREDNISONE 20 MG/1
TABLET ORAL
COMMUNITY
Start: 2024-11-19 | End: 2024-12-02

## 2024-11-29 RX ORDER — DROSPIRENONE 4 MG/1
1 TABLET, FILM COATED ORAL DAILY
Qty: 84 TABLET | Refills: 1 | Status: SHIPPED | OUTPATIENT
Start: 2024-11-29 | End: 2025-11-29

## 2024-11-29 NOTE — PROGRESS NOTES
Subjective:  23 year old    Chief Complaint   Patient presents with    Other     Irregular bleeding. Bladder control. Pt states she is bleeding every day and needs a tampon.      Pt here today with complaints of irregular menstrual bleeding for 6 months  Notes that since starting on Jackie has bled everyday  Has had DUB in the past that was stopped with progesterone  Bleeding is light to moderate  Is not filling tampon, and changing tampon about 3 times per day  Denies SOB, dizziness    Also with concerns of incontinence   Notes that this has been chronic  + stress incontinence  + urge incontinence  Denies hematuria, flank plain, fever    Pt also notes pain with intercourse  This has been for over 6 months  Not only in certain positions  Even with lubricant  Denies unusual vaginal discharge, unusual odor and irritation    Review of Systems:  Pertinent items are noted in the HPI.    Objective:  /76   Pulse 83   Ht 65\"   Wt 161 lb 2 oz (73.1 kg)   LMP 2024 (Exact Date)       Physical Examination:  General appearance: Well dressed, well nourished in no apparent distress  Neurologic/Psychiatric: Alert and oriented to person, place and time, mood normal, affect appropriate      Assessment/Plan:      Diagnoses and all orders for this visit:    Mixed incontinence  -     Pelvic Floor Therapy - [place] Location  - we also discussed role of urology  - avoid bladder irritants  - timed voiding  - to follow up with new/worsening symptoms    Pain in female genitalia on intercourse  -     Pelvic Floor Therapy - [place] Location  - water or silicone based lubricant  - trial of pelvic floor PT  - to follow up with new/worsening symptoms    Irregular menses  -     Drospirenone (SLYND) 4 MG Oral Tab; Take 1 tablet by mouth daily.  - No s/s of anemia  - if slynd does not stop menses to call for provera  - r/b/a of medication discussed  - to follow up with new/worsening symptoms or no improvement        Return in about  6 months (around 5/29/2025) for annual well woman exam and medication management .

## 2024-12-02 NOTE — PATIENT INSTRUCTIONS
Instructions for Birth Control Pill Use    The birth control pill works primarily by blocking ovulation (release of an egg).  If there is no egg to meet the sperm, pregnancy cannot occur.  The pill also works by making cervical mucous thick and unreceptive to sperm, slowing tubal function which has to move the egg down the tube to meet the sperm.    For women who follow these directions carefully, the pill is an effective reversible contraceptive currently available.    Starting birth control pills for the first time  1). Choose a backup method of birth control (such as condoms, diaphragm or foam) to use with your first pack of pills because the pill may not fully protect you from pregnancy during the first two weeks.  Keep this backup method handy and use it in case you:  Run out of pills  Forget to take your pill  Discontinue pill use  The use of condoms is ALWAYS encouraged to protect against sexually transmitted diseases, if applicable.   2). There are several ways to start taking your pills. Use one of the following approaches:  First day of period start: Start your first pack of pills on the day of your period. No back-up method is required  Sunday start: Start your first pack of pills on the first Sunday after your period begins.  This will result in your menses almost always beginning on a Tuesday or a Wednesday every 4 weeks.  You will need a backup method for 14 days.  Quick Start: Start immediately if pregnancy is excluded. You will need a back-up method for 14 days.  Quick start will cause your period to be irregular.  3). Take one pill a day until you finish the pack.   If you are using a 28-day pack, begin a new pack immediately. Skip no days between packages  If you are using a 21-day pack, stop taking pills for 1 week and then start your new pack   4). Try to associate taking your pill with something you do around the same time every day, like brushing your teeth in the morning, eating a meal or  going to bed.  Establishing a routine will make it easier for you to remember. The pill works best if you take it about the same time every day.    Continuing on the pills ~ What if……  1). If you have bleeding between periods  This is a very common side effect of birth control pills for the first 3 months.  Spotting (light bleeding between periods) can also occur if you miss a pill.  Call the doctor’s office for advice if bleeding is heavier than 1 pad an hour or the bleeding between periods last for more than 3 months  2). If you have nausea or vomiting  Nausea and vomiting may occur during the first few months of taking birth control pills.  Sometimes by changing the time of the day when you take the pill will help.  Try switching to dinner time or before bed.  If you had episodes of vomiting within 2 hours of taking your pill, please use a back-up plan for the rest of the cycle because your body may not absorbed the pill.  Contact your doctor’s office if you have persistent nausea and vomiting.  3). If you miss your period  It is not uncommon for your periods to become lighter while taking birth control pills or miss you period all together.  If you missed any pills in the pack prior to this occurring, you should take a home pregnancy test prior to starting a new pack.  4). If you forget your pills for a day or two follow the instructions below:  If you miss a pill, take the forgotten one (yesterday’s pill) as soon as you remember it and take today’s pill at the regular time.  Although you probably will not become pregnant, use a back-up method until your next period.  If you miss two pills in a row, take two pills as soon as you remember and two pills the next day.  You may have some spotting and nausea.  Please use a back-up method until your next period.  If you miss three or more pills in a row, do not take all 3 pills at once.  If you are in the third week of pills, finish the pack and skip the inactive  pills and start a new pack.  Start your backup method of birth control immediately.  You are at risk for becoming pregnant if you do not use a backup contraception.    Remember, missed pills may cause you to start spotting or bleeding for about 7 days.    5). If you experience breast soreness, mild headaches, mild edema (swelling)  These symptoms are usually mild and will improve after being on the pill for 3 or more months.  If any of these symptoms are severe or persist more than 3 months, you should contact our office.  6). If you experience mood swings or changes  Usually, after you adjust to the hormones, these symptoms will improve.  If at any time these symptoms are severe or persistent, you should contact our office.    7). If your doctor has you on continuous pills (No 7 day break after 21 days of active pills) in order to suppress your menses because of endometriosis or premenstrual syndrome, you will likely have break through bleeding.  If the spotting persists through more than 3 packs of pills, contact your doctor to confirm that you should stay on that brand of pills    8). If you need to take any other medications, including antibiotics, check with the pharmacist to see if there will be any interaction or if it will make your birth control pill less effective.      When to contact your provider  If you are having severe abdominal pain  Chest pain and shortness of breath  Severe Headaches  Blurred Vision or Vision Loss  Severe leg pain- calf or thigh    If you have additional questions or concerns, please call us at [phone]

## 2024-12-09 ENCOUNTER — TELEPHONE (OUTPATIENT)
Dept: FAMILY MEDICINE CLINIC | Facility: CLINIC | Age: 24
End: 2024-12-09

## 2024-12-09 NOTE — TELEPHONE ENCOUNTER
Patient is establishing care with PCP. Ok to switch to Dr. Hernandez per note below.     Dr. Hernandez, please advise possible date to see patient. Thank you.

## 2024-12-09 NOTE — TELEPHONE ENCOUNTER
Pt states her mother (Padma, Sharon) received confirmation from Dr Hernandez that it is okay to switch PCP to Dr Hernandez.    Pt is in town from college for next few weeks and is requesting appointment with Dr PÉREZ to discuss \"Possible vitamin deficiency, or immune system issue. Getting sick over and over.\"    Please advise if/when to schedule/ if ok to switch PCP?

## 2024-12-10 NOTE — TELEPHONE ENCOUNTER
I can work her in this Friday at 2 PM for physical.  I can see her in January okay to use SDA for her. If  we have any cancellation next week we will call her if they will be opening.Thanks.

## 2024-12-19 ENCOUNTER — OFFICE VISIT (OUTPATIENT)
Dept: FAMILY MEDICINE CLINIC | Facility: CLINIC | Age: 24
End: 2024-12-19
Payer: COMMERCIAL

## 2024-12-19 ENCOUNTER — LAB ENCOUNTER (OUTPATIENT)
Dept: LAB | Age: 24
End: 2024-12-19
Attending: FAMILY MEDICINE
Payer: COMMERCIAL

## 2024-12-19 VITALS
HEART RATE: 100 BPM | HEIGHT: 65 IN | BODY MASS INDEX: 26.82 KG/M2 | WEIGHT: 161 LBS | SYSTOLIC BLOOD PRESSURE: 100 MMHG | TEMPERATURE: 97 F | DIASTOLIC BLOOD PRESSURE: 70 MMHG

## 2024-12-19 DIAGNOSIS — Z13.89 SCREENING FOR GENITOURINARY CONDITION: ICD-10-CM

## 2024-12-19 DIAGNOSIS — Z00.00 ANNUAL PHYSICAL EXAM: Primary | ICD-10-CM

## 2024-12-19 DIAGNOSIS — E55.9 VITAMIN D DEFICIENCY: ICD-10-CM

## 2024-12-19 DIAGNOSIS — M79.10 MYALGIA: ICD-10-CM

## 2024-12-19 DIAGNOSIS — Z00.00 LABORATORY EXAMINATION ORDERED AS PART OF A ROUTINE GENERAL MEDICAL EXAMINATION: ICD-10-CM

## 2024-12-19 DIAGNOSIS — M25.50 ARTHRALGIA, UNSPECIFIED JOINT: ICD-10-CM

## 2024-12-19 DIAGNOSIS — G47.10 HYPERSOMNIA: ICD-10-CM

## 2024-12-19 DIAGNOSIS — J02.9 SORE THROAT: ICD-10-CM

## 2024-12-19 DIAGNOSIS — Z00.00 ANNUAL PHYSICAL EXAM: ICD-10-CM

## 2024-12-19 DIAGNOSIS — J02.0 STREP THROAT: ICD-10-CM

## 2024-12-19 LAB
ALBUMIN SERPL-MCNC: 4.6 G/DL (ref 3.2–4.8)
ALBUMIN/GLOB SERPL: 1.5 {RATIO} (ref 1–2)
ALP LIVER SERPL-CCNC: 60 U/L
ALT SERPL-CCNC: 19 U/L
ANION GAP SERPL CALC-SCNC: 7 MMOL/L (ref 0–18)
AST SERPL-CCNC: 24 U/L (ref ?–34)
BASOPHILS # BLD AUTO: 0.02 X10(3) UL (ref 0–0.2)
BASOPHILS NFR BLD AUTO: 0.3 %
BILIRUB SERPL-MCNC: 0.4 MG/DL (ref 0.3–1.2)
BILIRUB UR QL STRIP.AUTO: NEGATIVE
BUN BLD-MCNC: 11 MG/DL (ref 9–23)
CALCIUM BLD-MCNC: 9.5 MG/DL (ref 8.7–10.4)
CHLORIDE SERPL-SCNC: 102 MMOL/L (ref 98–112)
CHOLEST SERPL-MCNC: 183 MG/DL (ref ?–200)
CLARITY UR REFRACT.AUTO: CLEAR
CO2 SERPL-SCNC: 29 MMOL/L (ref 21–32)
COLOR UR AUTO: YELLOW
CONTROL LINE PRESENT WITH A CLEAR BACKGROUND (YES/NO): YES YES/NO
CREAT BLD-MCNC: 0.74 MG/DL
CRP SERPL-MCNC: 0.4 MG/DL (ref ?–0.5)
EGFRCR SERPLBLD CKD-EPI 2021: 118 ML/MIN/1.73M2 (ref 60–?)
EOSINOPHIL # BLD AUTO: 0.24 X10(3) UL (ref 0–0.7)
EOSINOPHIL NFR BLD AUTO: 3 %
ERYTHROCYTE [DISTWIDTH] IN BLOOD BY AUTOMATED COUNT: 13.6 %
ERYTHROCYTE [SEDIMENTATION RATE] IN BLOOD: 28 MM/HR
FASTING PATIENT LIPID ANSWER: YES
FASTING STATUS PATIENT QL REPORTED: YES
GLOBULIN PLAS-MCNC: 3.1 G/DL (ref 2–3.5)
GLUCOSE BLD-MCNC: 89 MG/DL (ref 70–99)
GLUCOSE UR STRIP.AUTO-MCNC: NORMAL MG/DL
HCT VFR BLD AUTO: 42.6 %
HDLC SERPL-MCNC: 76 MG/DL (ref 40–59)
HGB BLD-MCNC: 13.8 G/DL
IMM GRANULOCYTES # BLD AUTO: 0.02 X10(3) UL (ref 0–1)
IMM GRANULOCYTES NFR BLD: 0.3 %
KETONES UR STRIP.AUTO-MCNC: NEGATIVE MG/DL
KIT LOT #: NORMAL NUMERIC
LDLC SERPL CALC-MCNC: 88 MG/DL (ref ?–100)
LEUKOCYTE ESTERASE UR QL STRIP.AUTO: NEGATIVE
LYMPHOCYTES # BLD AUTO: 2.89 X10(3) UL (ref 1–4)
LYMPHOCYTES NFR BLD AUTO: 36.5 %
MCH RBC QN AUTO: 29.6 PG (ref 26–34)
MCHC RBC AUTO-ENTMCNC: 32.4 G/DL (ref 31–37)
MCV RBC AUTO: 91.2 FL
MONOCYTES # BLD AUTO: 0.62 X10(3) UL (ref 0.1–1)
MONOCYTES NFR BLD AUTO: 7.8 %
NEUTROPHILS # BLD AUTO: 4.12 X10 (3) UL (ref 1.5–7.7)
NEUTROPHILS # BLD AUTO: 4.12 X10(3) UL (ref 1.5–7.7)
NEUTROPHILS NFR BLD AUTO: 52.1 %
NITRITE UR QL STRIP.AUTO: NEGATIVE
NONHDLC SERPL-MCNC: 107 MG/DL (ref ?–130)
OSMOLALITY SERPL CALC.SUM OF ELEC: 285 MOSM/KG (ref 275–295)
PH UR STRIP.AUTO: 6 [PH] (ref 5–8)
PLATELET # BLD AUTO: 346 10(3)UL (ref 150–450)
POTASSIUM SERPL-SCNC: 4.2 MMOL/L (ref 3.5–5.1)
PROT SERPL-MCNC: 7.7 G/DL (ref 5.7–8.2)
PROT UR STRIP.AUTO-MCNC: NEGATIVE MG/DL
RBC # BLD AUTO: 4.67 X10(6)UL
RBC UR QL AUTO: NEGATIVE
RHEUMATOID FACT SERPL-ACNC: 5.2 IU/ML (ref ?–14)
SODIUM SERPL-SCNC: 138 MMOL/L (ref 136–145)
SP GR UR STRIP.AUTO: 1.02 (ref 1–1.03)
STREP GRP A CUL-SCR: POSITIVE
TRIGL SERPL-MCNC: 108 MG/DL (ref 30–149)
TSI SER-ACNC: 1.31 UIU/ML (ref 0.55–4.78)
URATE SERPL-MCNC: 4.9 MG/DL
UROBILINOGEN UR STRIP.AUTO-MCNC: NORMAL MG/DL
VIT B12 SERPL-MCNC: 665 PG/ML (ref 211–911)
VIT D+METAB SERPL-MCNC: 48.9 NG/ML (ref 30–100)
VLDLC SERPL CALC-MCNC: 17 MG/DL (ref 0–30)
WBC # BLD AUTO: 7.9 X10(3) UL (ref 4–11)

## 2024-12-19 PROCEDURE — 85652 RBC SED RATE AUTOMATED: CPT

## 2024-12-19 PROCEDURE — 86200 CCP ANTIBODY: CPT

## 2024-12-19 PROCEDURE — 86431 RHEUMATOID FACTOR QUANT: CPT

## 2024-12-19 PROCEDURE — 86140 C-REACTIVE PROTEIN: CPT

## 2024-12-19 PROCEDURE — 84550 ASSAY OF BLOOD/URIC ACID: CPT

## 2024-12-19 PROCEDURE — 80053 COMPREHEN METABOLIC PANEL: CPT

## 2024-12-19 PROCEDURE — 86038 ANTINUCLEAR ANTIBODIES: CPT

## 2024-12-19 PROCEDURE — 86225 DNA ANTIBODY NATIVE: CPT

## 2024-12-19 PROCEDURE — 84443 ASSAY THYROID STIM HORMONE: CPT

## 2024-12-19 PROCEDURE — 82607 VITAMIN B-12: CPT

## 2024-12-19 PROCEDURE — 82306 VITAMIN D 25 HYDROXY: CPT

## 2024-12-19 PROCEDURE — 36415 COLL VENOUS BLD VENIPUNCTURE: CPT

## 2024-12-19 PROCEDURE — 80061 LIPID PANEL: CPT

## 2024-12-19 PROCEDURE — 85025 COMPLETE CBC W/AUTO DIFF WBC: CPT

## 2024-12-19 PROCEDURE — 81003 URINALYSIS AUTO W/O SCOPE: CPT

## 2024-12-19 RX ORDER — CEFDINIR 300 MG/1
300 CAPSULE ORAL 2 TIMES DAILY
Qty: 20 CAPSULE | Refills: 0 | Status: SHIPPED | OUTPATIENT
Start: 2024-12-19

## 2024-12-19 NOTE — PATIENT INSTRUCTIONS
Do blood work today.  Further recommendation pending test results.  See sleep specialist for evaluation of the excessive sleepiness.  Healthy diet.  Stay active.   Start antibiotic today per directions.  Take probiotic over-the-counter daily like organic yogurt while taking antibiotic.  Drink plenty of fluids.  Take Tylenol or ibuprofen as needed for fever or for pain.    Continue Flonase nasal spray.  Forward immunizations record to our office.

## 2024-12-19 NOTE — PROGRESS NOTES
HPI:   Usha Rouse is a 23 year old female who presents for a complete physical exam. Symptoms: denies discharge, itching, burning or dysuria.  Patient is new for me today.  Patient's parents are patients in our office.  Patient is a student studying early childhood education.    Patient complains of having sore throat started in September.  Had couple of negative tests for strep.  He is waking up with sore throat recently.  2 weeks ago she was treated with antibiotic for infection.  Has history of asthma is acting out when she is sick.    Patient has a history of TMJ had arthrocentesis done using cyclobenzaprine anti-inflammatory medication as needed.    Patient has hypersomnia.  She could be sleeping 16 hours a day.  When she is not doing anything at sitting she is falling asleep.  It is going on for a while.  She was referred  for sleep test which came back normal.  Never had a official evaluation by the sleep specialist regarding hypersomnia.  She is willing to see the sleep specialist.  She goes to bed around 11 PM.  Getting up in the morning or at 6:45 AM.  When she is busy she is okay.  She can take long naps 4 to 6 hours and still feeling tired after waking up.    She saw neurologist regarding migraines Dr. Ocasio.    Patient has some myalgias arthralgias.  Will proceed with rheumatologic testing.  Some stuffiness runny nose.  Strep test came back positive in the office we will treat with antibiotic.    Vitamin D was low in the past check levels replace if needed.  Immunization History   Administered Date(s) Administered    Hpv Virus Vaccine 9 [facility] Im 02/03/2021, 05/20/2021    Influenza 10/27/2021    Meningococcal-Menactra 08/20/2020    Serogroup B Meningococcal 3 Dose 08/31/2017, 09/04/2018      Wt Readings from Last 6 Encounters:   12/19/24 161 lb (73 kg)   11/29/24 161 lb 2 oz (73.1 kg)   06/14/24 162 lb 8 oz (73.7 kg)   11/22/23 160 lb (72.6 kg)   08/14/23 162 lb (73.5 kg)   08/02/23 161 lb 2.5 oz  (73.1 kg)     Body mass index is 26.79 kg/m².     Cholesterol, Total (mg/dL)   Date Value   12/19/2024 183   05/24/2022 175     HDL Cholesterol (mg/dL)   Date Value   12/19/2024 76 (H)   05/24/2022 49     LDL Cholesterol (mg/dL)   Date Value   12/19/2024 88   05/24/2022 108 (H)     AST (U/L)   Date Value   12/19/2024 24   01/11/2023 14 (L)   05/24/2022 16     ALT (U/L)   Date Value   12/19/2024 19   01/11/2023 15   05/24/2022 13      No results found for: \"GLUCOSE\"     Current Outpatient Medications   Medication Sig Dispense Refill    cefdinir 300 MG Oral Cap Take 1 capsule (300 mg total) by mouth 2 (two) times daily. 20 capsule 0    cyclobenzaprine 5 MG Oral Tab Take 1-2 tablets (5-10 mg total) by mouth 2 (two) times daily as needed for Muscle spasms. 30 tablet 0    naproxen 500 MG Oral Tab Take 1 tablet (500 mg total) by mouth 2 (two) times daily as needed (pain). 60 tablet 0    Doxycycline Hyclate 20 MG Oral Tab Take 1.5 tablets (30 mg total) by mouth daily as needed.      doxycycline in sodium chloride 0.9% 500 mg/50mL Intrapleural Solution       busPIRone 5 MG Oral Tab Take 1 tablet (5 mg total) by mouth every 12 (twelve) hours as needed.      DULoxetine 30 MG Oral Cap DR Particles Take 1 capsule (30 mg total) by mouth daily.      Drospirenone (SLYND) 4 MG Oral Tab Take 1 tablet by mouth daily. 84 tablet 1      Past Medical History:    Allergic rhinitis    Anemia    Anxiety    Asthma (HCC)    Depression    Pneumonia due to organism      History reviewed. No pertinent surgical history.   Family History   Problem Relation Age of Onset    Depression Mother     Anemia Mother     Asthma Mother     Diabetes Maternal Grandmother     Diabetes Maternal Grandfather     Diabetes Maternal Aunt     Diabetes Maternal Uncle       Social History:   Social History     Socioeconomic History    Marital status: Single   Occupational History    Occupation: Student     Comment: [employer] childhood   Tobacco Use    Smoking status: Never     Smokeless tobacco: Never   Vaping Use    Vaping status: Never Used   Substance and Sexual Activity    Alcohol use: Yes     Alcohol/week: 4.0 - 5.0 standard drinks of alcohol     Types: 4 - 5 Standard drinks or equivalent per week     Comment: Socially    Drug use: Never    Sexual activity: Not Currently     Partners: Male     Birth control/protection: OCP   Other Topics Concern    Caffeine Concern Yes     Comment: 1 coffee per day    Stress Concern No    Weight Concern No    Special Diet No    Exercise Yes     Comment: 5 days per week    Seat Belt Yes     Occ:student. : no Children: none  Exercise: walking.  Diet: watches calories closely     REVIEW OF SYSTEMS:   GENERAL: feels well otherwise excessive tiredness fatigue excessive sleepiness  SKIN: denies any unusual skin lesions  EYES:denies blurred vision or double vision  HEENT: denies nasal congestion, sinus pain, has sore throat  LUNGS: denies shortness of breath with exertion  CARDIOVASCULAR: denies chest pain on exertion  GI: denies abdominal pain,denies heartburn  : denies dysuria, vaginal discharge or itching,periods regular   MUSCULOSKELETAL: denies back pain, myalgias arthralgias  NEURO: denies headaches  PSYCHE: denies depression or anxiety  HEMATOLOGIC: denies hx of anemia  ENDOCRINE: denies thyroid history  ALL/ASTHMA: denies hx of allergy or asthma    EXAM:   /70 (BP Location: Left arm, Patient Position: Sitting, Cuff Size: adult)   Pulse 100   Temp 97.2 °F (36.2 °C) (Temporal)   Ht 5' 5\" (1.651 m)   Wt 161 lb (73 kg)   LMP 11/27/2024 (Exact Date)   BMI 26.79 kg/m²   Body mass index is 26.79 kg/m².   GENERAL: well developed, well nourished,in no apparent distress  SKIN: no rashes,no suspicious lesions  HEENT: atraumatic, normocephalic,ears and throat are clear  EYES:PERRLA, EOMI, conjunctiva are clear  NECK: supple,no adenopathy,  CHEST: no chest tenderness  BREAST: Done by OB/GYN.  LUNGS: clear to auscultation  CARDIO: RRR  without murmur  GI: good BS's,no masses, HSM or tenderness   done by OB/GYN  RECTAL: Deferred  MUSCULOSKELETAL: back is not tender,FROM of the back  EXTREMITIES: no cyanosis, clubbing or edema  NEURO: Oriented times three,cranial nerves are intact,motor and sensory are grossly intact    Results for orders placed or performed in visit on 12/19/24   Rapid Strep    Collection Time: 12/19/24 10:57 AM   Result Value Ref Range    Strep Grp A Screen Positive Negative    Control Line Present with a clear background (yes/no) Yes Yes/No    Kit Lot # [id number] Numeric    Kit Expiration Date 11/30/2025 Date      ASSESSMENT AND PLAN:   Usha Rouse is a 23 year old female who presents for a complete physical exam.   Encounter Diagnoses   Name Primary?    Annual physical exam Yes    Laboratory examination ordered as part of a routine general medical examination     Screening for genitourinary condition     Vitamin D deficiency     Hypersomnia     Sore throat     Myalgia     Arthralgia, unspecified joint     Strep throat        Orders Placed This Encounter   Procedures    CBC With Differential With Platelet    Comp Metabolic Panel (14)    Lipid Panel    Urinalysis with Culture Reflex    TSH W Reflex To Free T4    Vitamin D [E]    Rapid Strep    Connective Tissue Disease (DAISY) Screen, Reflex Specific Antibody ([place])    Rheumatoid Arthritis Factor    Uric Acid, Serum    Cyclic Citrullinate Peptide (CCP) antibodies    Sed Rate, [place] (Automated)    C-Reactive Protein    Vitamin B12 [E]       Meds & Refills for this Visit:  Requested Prescriptions     Signed Prescriptions Disp Refills    cefdinir 300 MG Oral Cap 20 capsule 0     Sig: Take 1 capsule (300 mg total) by mouth 2 (two) times daily.   Do blood work today.  Further recommendation pending test results.  See sleep specialist for evaluation of the excessive sleepiness.  Healthy diet.  Stay active.   Start antibiotic today per directions.  Take probiotic  over-the-counter daily like organic yogurt while taking antibiotic.  Drink plenty of fluids.  Take Tylenol or ibuprofen as needed for fever or for pain.    Continue Flonase nasal spray.  Forward immunizations record to our office.    Imaging & Consults:  SLEEP MEDICINE - INTERNAL  Pap and pelvic done. Order put in for mammogram and dexascan. Self breast exam explained. Health maintenance, will check fasting Lipids, CMP, and CBC. Pt referred for screening colonoscopy. Pt' s weight is Body mass index is 26.79 kg/m²., recommended low carb diet and aerobic exercise 30 minutes three times weekly.  The patient indicates understanding of these issues and agrees to the plan.  The patient is asked to return for CPX in year.  Follow-up after test results when completed.

## 2024-12-20 LAB
CCP IGG SERPL-ACNC: 1.4 U/ML (ref 0–6.9)
DSDNA IGG SERPL IA-ACNC: 1.3 IU/ML
ENA AB SER QL IA: 0.2 UG/L
ENA AB SER QL IA: NEGATIVE

## 2024-12-23 DIAGNOSIS — M79.10 MYALGIA: Primary | ICD-10-CM

## 2024-12-23 DIAGNOSIS — M25.50 ARTHRALGIA, UNSPECIFIED JOINT: ICD-10-CM

## 2024-12-30 ENCOUNTER — PATIENT MESSAGE (OUTPATIENT)
Dept: FAMILY MEDICINE CLINIC | Facility: CLINIC | Age: 24
End: 2024-12-30

## 2024-12-31 NOTE — TELEPHONE ENCOUNTER
Please review my chart message form patient. I sent her a my chart message that she will likely not get a response until on or after 01/06/2025.

## 2025-01-08 ENCOUNTER — PATIENT MESSAGE (OUTPATIENT)
Dept: OBGYN CLINIC | Facility: CLINIC | Age: 25
End: 2025-01-08

## 2025-01-27 ENCOUNTER — TELEPHONE (OUTPATIENT)
Dept: FAMILY MEDICINE CLINIC | Facility: CLINIC | Age: 25
End: 2025-01-27

## 2025-01-27 DIAGNOSIS — J02.9 SORE THROAT: Primary | ICD-10-CM

## 2025-01-27 DIAGNOSIS — R22.1 LOCALIZED SWELLING, MASS AND LUMP, NECK: ICD-10-CM

## 2025-01-27 NOTE — TELEPHONE ENCOUNTER
Patient should be evaluated considering acuity of her symptoms.  I would like her to go to emergency room by the school to be evaluated now.  She may need imaging test to make sure that she does not have an abscess.  We can also give patient ENT Dr. Mas contact information she can see him or one of his partners for evaluation after ER visit which could be saving her time since I remember with her symptoms started already in the fall.  Thanks.

## 2025-01-27 NOTE — TELEPHONE ENCOUNTER
1. What are your symptoms?  Swollen tonsils, sore throat.      2. How long have you been having these symptoms?  1 month       3. Have you done anything already to treat your symptoms?         ADDITIONAL INFO:    Patient was tested for strep and mono. Test came back negative.

## 2025-01-27 NOTE — TELEPHONE ENCOUNTER
Per patient, she was seen in office on 12/19/24, and tested positive for strep. She was put on Cefdinir 300MG BID for 10 days. On the last day of the antibiotic, her throat felt much worse.     [facility]. They tested for strep and mono, both negative. She had white spots on her tonsils and they were swollen. She was given a Z-amber, no change in symptoms. She was given a steroid injection at that time, that seemed to help for a few days only.     Ever since then, she is still with the white spots, swollen tonsils, and sore throat. The sore throat is worse in the morning. Afebrile.    The right neck is more swollen and more tender. No swelling to the left side of neck. Per patient, from the right ear to right side of chin is swollen. No ear pain to either ear.     Patient is currently not taking any prescriptions for this, she occasionally takes ibuprofen.     Patient is currently at college at [employer]. She cannot be seen in the office until her spring break, which is March 24th-28th.

## 2025-01-28 ENCOUNTER — PATIENT MESSAGE (OUTPATIENT)
Dept: FAMILY MEDICINE CLINIC | Facility: CLINIC | Age: 25
End: 2025-01-28

## 2025-01-28 NOTE — TELEPHONE ENCOUNTER
Informed patient of recommendations to be seen at ER near her school now.    Patient agrees to plan and verbalized an understanding.     Information given for:   Félix Mas M.D.  Otolaryngology (ENT)  [facility]  Phone: [phone]

## 2025-01-30 NOTE — TELEPHONE ENCOUNTER
Okay to use SDA for this patient for requested dates.  However since she has a neck pain we may need to have her to be evaluated sooner.  If the symptoms will be worsening she needs to consider ER visit at her school.  Thanks

## 2025-03-26 ENCOUNTER — OFFICE VISIT (OUTPATIENT)
Facility: LOCATION | Age: 25
End: 2025-03-26
Payer: COMMERCIAL

## 2025-03-26 DIAGNOSIS — J35.01 CHRONIC TONSILLITIS: Primary | ICD-10-CM

## 2025-03-26 PROCEDURE — 99203 OFFICE O/P NEW LOW 30 MIN: CPT | Performed by: OTOLARYNGOLOGY

## 2025-03-27 NOTE — PROGRESS NOTES
Usha Rouse is a 24 year old female. No chief complaint on file.    HPI:   She has had problems with chronic tonsillitis.  She has had tonsillitis all of her life.  More recently the sore throats are worsening.  She has had 2 episodes of strep throat in the last 2 months.  Current Outpatient Medications   Medication Sig Dispense Refill    cefdinir 300 MG Oral Cap Take 1 capsule (300 mg total) by mouth 2 (two) times daily. 20 capsule 0    Drospirenone (SLYND) 4 MG Oral Tab Take 1 tablet by mouth daily. 84 tablet 1    cyclobenzaprine 5 MG Oral Tab Take 1-2 tablets (5-10 mg total) by mouth 2 (two) times daily as needed for Muscle spasms. 30 tablet 0    naproxen 500 MG Oral Tab Take 1 tablet (500 mg total) by mouth 2 (two) times daily as needed (pain). 60 tablet 0    Doxycycline Hyclate 20 MG Oral Tab Take 1.5 tablets (30 mg total) by mouth daily as needed.      doxycycline in sodium chloride 0.9% 500 mg/50mL Intrapleural Solution       busPIRone 5 MG Oral Tab Take 1 tablet (5 mg total) by mouth every 12 (twelve) hours as needed.      DULoxetine 30 MG Oral Cap DR Particles Take 1 capsule (30 mg total) by mouth daily.        Past Medical History:    Allergic rhinitis    Anemia    Anxiety    Asthma (HCC)    Depression    Pneumonia due to organism      Social History:  Social History     Socioeconomic History    Marital status: Single   Occupational History    Occupation: Student     Comment: [employer] childhood   Tobacco Use    Smoking status: Never    Smokeless tobacco: Never   Vaping Use    Vaping status: Never Used   Substance and Sexual Activity    Alcohol use: Yes     Alcohol/week: 4.0 - 5.0 standard drinks of alcohol     Types: 4 - 5 Standard drinks or equivalent per week     Comment: Socially    Drug use: Never    Sexual activity: Not Currently     Partners: Male     Birth control/protection: OCP   Other Topics Concern    Caffeine Concern Yes     Comment: 1 coffee per day    Stress Concern No    Weight Concern No     Special Diet No    Exercise Yes     Comment: 5 days per week    Seat Belt Yes      History reviewed. No pertinent surgical history.      REVIEW OF SYSTEMS:   GENERAL HEALTH: feels well otherwise  GENERAL : denies fever, chills, sweats, weight loss, weight gain  SKIN: denies any unusual skin lesions or rashes  RESPIRATORY: denies shortness of breath with exertion  NEURO: denies headaches    EXAM:   LMP 11/27/2024 (Exact Date)     System Findings Details   Constitutional  Overall appearance - Normal.   Psychiatric  Orientation - Oriented to time, place, person & situation. Appropriate mood and affect.   Head/Face  Facial features -- Normal. Skull - Normal.   Eyes  Pupils equal ,round ,react to light and accomidate   Ears, Nose, Throat, Neck  Ears clear nose mild congestion oropharynx +2/4 cryptic tonsils neck no masses   Neurological  Memory - Normal. Cranial nerves - Cranial nerves II through XII grossly intact.   Lymph Detail  Submental. Submandibular. Anterior cervical. Posterior cervical. Supraclavicular.       ASSESSMENT AND PLAN:   1. Chronic tonsillitis  She has had chronic tonsil problems all of her life.  They have worsened as of late.  Is reasonable to proceed with tonsillectomy.The risk benefits and alternatives were explained to the patient and/or parents.  The risks are to include not limited to bleeding infection recurrent bleeding which could be serious velopharyngeal insufficiency and nonresolution of symptoms.        The patient indicates understanding of these issues and agrees to the plan.    No follow-ups on file.    Félxi Mas MD  3/27/2025  8:04 AM

## 2025-03-28 ENCOUNTER — TELEPHONE (OUTPATIENT)
Dept: FAMILY MEDICINE CLINIC | Facility: CLINIC | Age: 25
End: 2025-03-28

## 2025-03-28 NOTE — TELEPHONE ENCOUNTER
Patient called requesting if she can have a refill of DULoxetine 30 MG Oral Cap DR Particles.     Patient is almost out of medication.     Please send to:  [id number] - [place], [place] - [address] AT [facility] [address], [phone], [phone]    Please advise.

## 2025-03-28 NOTE — TELEPHONE ENCOUNTER
Called patient and informed her that we never filled Duloxetine for her and informed her that the last time it was recorded in her chart was in 2022 and was historical.    Advised patient to look at her bottle and it should say who prescribed it and if not to call her pharmacy who filled it and they can also confirm it.  Patient voiced understanding and agreed with plan.

## 2025-03-31 ENCOUNTER — TELEPHONE (OUTPATIENT)
Facility: LOCATION | Age: 25
End: 2025-03-31

## 2025-03-31 DIAGNOSIS — J35.01 CHRONIC TONSILLITIS: Primary | ICD-10-CM

## 2025-04-20 ENCOUNTER — OFFICE VISIT (OUTPATIENT)
Dept: FAMILY MEDICINE CLINIC | Facility: CLINIC | Age: 25
End: 2025-04-20
Payer: COMMERCIAL

## 2025-04-20 VITALS
BODY MASS INDEX: 27 KG/M2 | TEMPERATURE: 98 F | HEART RATE: 94 BPM | WEIGHT: 161.19 LBS | RESPIRATION RATE: 18 BRPM | OXYGEN SATURATION: 98 % | SYSTOLIC BLOOD PRESSURE: 118 MMHG | DIASTOLIC BLOOD PRESSURE: 72 MMHG

## 2025-04-20 DIAGNOSIS — J06.9 VIRAL URI WITH COUGH: Primary | ICD-10-CM

## 2025-04-20 PROCEDURE — 99213 OFFICE O/P EST LOW 20 MIN: CPT | Performed by: NURSE PRACTITIONER

## 2025-04-20 PROCEDURE — 3074F SYST BP LT 130 MM HG: CPT | Performed by: NURSE PRACTITIONER

## 2025-04-20 PROCEDURE — 3078F DIAST BP <80 MM HG: CPT | Performed by: NURSE PRACTITIONER

## 2025-04-20 NOTE — PATIENT INSTRUCTIONS
Push fluids and rest  Humidfier  May use expectorant such as Mucinex for cough  May take decongestant such as sudafed for sinus congestion and drainage  Flonase nasal spray for sinus congestion  Tylenol/ ibuprofen for pain  Continue albuterol inhaler if needed for wheezing  Follow up for new/ worsening symptoms or symptoms not improving over the next few days.

## 2025-04-20 NOTE — PROGRESS NOTES
CHIEF COMPLAINT:     Chief Complaint   Patient presents with    Cough     Wet cough with wheezing and sore throat for 2 days. OTC meds/inhaler used.        HPI:   Usha Rouse is a 24 year old female who presents for cough, sinus congestion, post nasal drainage,  and wheezing. Symptoms started 2 days ago. Admits hx of asthma, using her albuterol inhaler when needed for wheezing, last needed to use her albuterol last night. Cough is productive with yellow phlegm. States cough hurts her chest and throat.  No fever.  Treating symptoms with nyquil at night and ibuprofen.    Current Medications[1]   Past Medical History[2]   Past Surgical History[3]      Short Social Hx on File[4]      REVIEW OF SYSTEMS:   GENERAL: no fever. + body aches. normal appetite  SKIN: no rashes or abnormal skin lesions  HEENT: See HPI  LUNGS: denies shortness of breath or wheezing, See HPI  CARDIOVASCULAR: reports chest pain when coughing, otherwise denies chest pain or palpitations   GI: denies N/V/C or abdominal pain  NEURO: Denies dizziness or weakness    EXAM:   /72   Pulse 94   Temp 98.4 °F (36.9 °C) (Oral)   Resp 18   Wt 161 lb 3.2 oz (73.1 kg)   LMP 03/30/2025 (Exact Date)   SpO2 98%   BMI 26.83 kg/m²   GENERAL: well developed, well nourished,in no apparent distress  SKIN: no rashes,no suspicious lesions  HEAD: atraumatic, normocephalic.    EYES: conjunctiva clear, EOM intact  EARS: TM's gray, no bulging, no retraction,no fluid, bony landmarks visible  NOSE: Nostrils patent, clear nasal discharge, nasal mucosa erythematous   THROAT: Oral mucosa pink, moist. Posterior pharynx is non erythematous. no exudates. Tonsils 2/4.    NECK: Supple, non-tender  LUNGS: clear to auscultation bilaterally, no wheezes or rhonchi. Breathing is non labored.  CARDIO: RRR without murmur  EXTREMITIES: no cyanosis, clubbing or edema  LYMPH:  no lymphadenopathy.        ASSESSMENT AND PLAN:   Usha Rouse is a 24 year old female who presents with  upper respiratory symptoms that are consistent with    ASSESSMENT:   Encounter Diagnosis   Name Primary?    Viral URI with cough Yes         PLAN:   Suspect viral etiology. Offered viral respiratory panel, pt declines   Advised may continue albuterol if needed for wheezing. At this time lungs are clear on exam.   Comfort care per pt instructions.   To follow up for any new or worsening symptoms or if not improving the next few days.   To go to the ER for any severe symptoms such as difficulty breathing or chest pain.     Meds & Refills for this Visit:  Requested Prescriptions      No prescriptions requested or ordered in this encounter       Risks, benefits, and side effects of medication explained and discussed.    Patient Instructions   Push fluids and rest  Humidfier  May use expectorant such as Mucinex for cough  May take decongestant such as sudafed for sinus congestion and drainage  Flonase nasal spray for sinus congestion  Tylenol/ ibuprofen for pain  Follow up for new/ worsening symptoms or symptoms not improving over the next few days.     The patient indicates understanding of these issues and agrees to the plan.  The patient is asked to return if sx's persist or worsen.           [1]   Current Outpatient Medications   Medication Sig Dispense Refill    cefdinir 300 MG Oral Cap Take 1 capsule (300 mg total) by mouth 2 (two) times daily. 20 capsule 0    Drospirenone (SLYND) 4 MG Oral Tab Take 1 tablet by mouth daily. 84 tablet 1    cyclobenzaprine 5 MG Oral Tab Take 1-2 tablets (5-10 mg total) by mouth 2 (two) times daily as needed for Muscle spasms. 30 tablet 0    naproxen 500 MG Oral Tab Take 1 tablet (500 mg total) by mouth 2 (two) times daily as needed (pain). 60 tablet 0    Doxycycline Hyclate 20 MG Oral Tab Take 1.5 tablets (30 mg total) by mouth daily as needed.      doxycycline in sodium chloride 0.9% 500 mg/50mL Intrapleural Solution       busPIRone 5 MG Oral Tab Take 1 tablet (5 mg total) by mouth  every 12 (twelve) hours as needed.      DULoxetine 30 MG Oral Cap DR Particles Take 1 capsule (30 mg total) by mouth daily.     [2]   Past Medical History:   Allergic rhinitis    Anemia    Anxiety    Asthma (HCC)    Depression    Pneumonia due to organism   [3] No past surgical history on file.  [4]   Social History  Socioeconomic History    Marital status: Single   Occupational History    Occupation: Student     Comment: [employer] childhood   Tobacco Use    Smoking status: Never    Smokeless tobacco: Never   Vaping Use    Vaping status: Never Used   Substance and Sexual Activity    Alcohol use: Yes     Alcohol/week: 4.0 - 5.0 standard drinks of alcohol     Types: 4 - 5 Standard drinks or equivalent per week     Comment: Socially    Drug use: Never    Sexual activity: Not Currently     Partners: Male     Birth control/protection: OCP   Other Topics Concern    Caffeine Concern Yes     Comment: 1 coffee per day    Stress Concern No    Weight Concern No    Special Diet No    Exercise Yes     Comment: 5 days per week    Seat Belt Yes

## 2025-04-30 RX ORDER — ALBUTEROL SULFATE 90 UG/1
1 INHALANT RESPIRATORY (INHALATION) EVERY 6 HOURS PRN
COMMUNITY

## 2025-05-06 NOTE — H&P
[facility]  History & Physical    Usha Rouse Patient Status:  Hospital Outpatient Surgery    2000 MRN [id number]   Location [facility] SURGERY Attending Félix Mas MD   Hosp Day # 0 PCP Vivian Hernandez MD     History of Present Illness:  Usha Rouse is a(n) 24 year old female.  Patient with a history of chronic tonsillitis.    History:  Past Medical History[1]  Past Surgical History[2]  Family History[3]   reports that she has never smoked. She has never used smokeless tobacco. She reports current alcohol use of about 4.0 - 5.0 standard drinks of alcohol per week. She reports that she does not use drugs.    Allergies:  Allergies[4]    Home Medications:  Prescriptions Prior to Admission[5]    Physical Exam:   General: Alert, orientated x3.  Cooperative.  No apparent distress.  Vital Signs:  Ht 5' 5\" (1.651 m)   Wt 160 lb (72.6 kg)   LMP 2025 (Exact Date)   BMI 26.63 kg/m²   HEENT +2/4 cryptic tonsils  Neck: No tenderness to palpitation.  Full range of motion to flexion and extension, lateral rotation and lateral flexion of cervical spine.  No JVD. Supple.   Lungs: Clear to auscultation bilaterally.  Cardiac: Regular rate and rhythm. No murmur.  Abdomen:  Soft, non-distended, non-tender, with no rebound or guarding.  No peritoneal signs. No ascites.  Liver is within normal limits.  Spleen is not palpable.    Extremities:  No lower extremity edema noted.  Without clubbing or cyanosis.    Skin: Normal texture and turgor.  Lymphatic:  No palpable cervical lymphadenopathy.  Neurologic: Cranial nerves are grossly intact.  Motor strength and sensory examination is grossly normal.  No focal neurologic deficit.    Laboratory Data:      Impression and Plan:  Problem List[6]  Chronic tonsillitis    Tonsillectomy        Félix Mas MD  2025  9:10 AM         [1]   Past Medical History:   Allergic rhinitis    Anemia    Anxiety    Asthma (HCC)    Depression    Esophageal reflux     Hx of motion sickness    Migraines    Pneumonia due to organism   [2]   Past Surgical History:  Procedure Laterality Date    Dental surgery procedure      [place] teeth removed     [3]   Family History  Problem Relation Age of Onset    Depression Mother     Anemia Mother     Asthma Mother     Diabetes Maternal Grandmother     Diabetes Maternal Grandfather     Diabetes Maternal Aunt     Diabetes Maternal Uncle    [4] No Known Allergies  [5]   No medications prior to admission.   [6]   Patient Active Problem List  Diagnosis    Hypersomnia    Migraine without aura and without status migrainosus, not intractable    Fatigue    Depression

## 2025-05-09 ENCOUNTER — TELEPHONE (OUTPATIENT)
Dept: FAMILY MEDICINE CLINIC | Facility: CLINIC | Age: 25
End: 2025-05-09

## 2025-05-12 ENCOUNTER — LAB ENCOUNTER (OUTPATIENT)
Dept: LAB | Age: 25
End: 2025-05-12
Attending: INTERNAL MEDICINE
Payer: COMMERCIAL

## 2025-05-12 ENCOUNTER — ANESTHESIA EVENT (OUTPATIENT)
Dept: SURGERY | Facility: HOSPITAL | Age: 25
End: 2025-05-12
Payer: COMMERCIAL

## 2025-05-12 DIAGNOSIS — L50.9 URTICARIA: ICD-10-CM

## 2025-05-12 DIAGNOSIS — M79.18 MYOFASCIAL PAIN DYSFUNCTION SYNDROME: ICD-10-CM

## 2025-05-12 PROBLEM — F40.8 OTHER PHOBIC ANXIETY DISORDERS: Status: ACTIVE | Noted: 2025-05-12

## 2025-05-12 LAB
CRP SERPL-MCNC: 0.7 MG/DL (ref ?–0.5)
ERYTHROCYTE [SEDIMENTATION RATE] IN BLOOD: 20 MM/HR (ref 0–20)
IGA SERPL-MCNC: 282.6 MG/DL (ref 40–350)
IGM SERPL-MCNC: 101.8 MG/DL (ref 50–300)
IMMUNOGLOBULIN PNL SER-MCNC: 1028 MG/DL (ref 650–1600)
THYROGLOB SERPL-MCNC: 24 U/ML (ref ?–60)
THYROPEROXIDASE AB SERPL-ACNC: 32 U/ML (ref ?–60)

## 2025-05-12 PROCEDURE — 86225 DNA ANTIBODY NATIVE: CPT

## 2025-05-12 PROCEDURE — 83521 IG LIGHT CHAINS FREE EACH: CPT

## 2025-05-12 PROCEDURE — 86376 MICROSOMAL ANTIBODY EACH: CPT

## 2025-05-12 PROCEDURE — 84165 PROTEIN E-PHORESIS SERUM: CPT

## 2025-05-12 PROCEDURE — 84207 ASSAY OF VITAMIN B-6: CPT

## 2025-05-12 PROCEDURE — 86235 NUCLEAR ANTIGEN ANTIBODY: CPT

## 2025-05-12 PROCEDURE — 85652 RBC SED RATE AUTOMATED: CPT

## 2025-05-12 PROCEDURE — 86334 IMMUNOFIX E-PHORESIS SERUM: CPT

## 2025-05-12 PROCEDURE — 86003 ALLG SPEC IGE CRUDE XTRC EA: CPT

## 2025-05-12 PROCEDURE — 36415 COLL VENOUS BLD VENIPUNCTURE: CPT

## 2025-05-12 PROCEDURE — 86140 C-REACTIVE PROTEIN: CPT

## 2025-05-12 PROCEDURE — 82785 ASSAY OF IGE: CPT

## 2025-05-12 PROCEDURE — 86800 THYROGLOBULIN ANTIBODY: CPT

## 2025-05-12 PROCEDURE — 82784 ASSAY IGA/IGD/IGG/IGM EACH: CPT

## 2025-05-13 ENCOUNTER — HOSPITAL ENCOUNTER (OUTPATIENT)
Facility: HOSPITAL | Age: 25
Setting detail: HOSPITAL OUTPATIENT SURGERY
Discharge: HOME | End: 2025-05-13
Attending: OTOLARYNGOLOGY | Admitting: OTOLARYNGOLOGY
Payer: COMMERCIAL

## 2025-05-13 ENCOUNTER — ANESTHESIA (OUTPATIENT)
Dept: SURGERY | Facility: HOSPITAL | Age: 25
End: 2025-05-13
Payer: COMMERCIAL

## 2025-05-13 VITALS
DIASTOLIC BLOOD PRESSURE: 91 MMHG | RESPIRATION RATE: 16 BRPM | SYSTOLIC BLOOD PRESSURE: 114 MMHG | TEMPERATURE: 98 F | HEIGHT: 65 IN | BODY MASS INDEX: 26.66 KG/M2 | HEART RATE: 94 BPM | WEIGHT: 160 LBS | OXYGEN SATURATION: 93 %

## 2025-05-13 DIAGNOSIS — J35.01 CHRONIC TONSILLITIS: ICD-10-CM

## 2025-05-13 PROBLEM — J35.1 TONSILLAR HYPERTROPHY: Status: ACTIVE | Noted: 2025-05-13

## 2025-05-13 LAB
B-HCG UR QL: NEGATIVE
KAPPA LC FREE SER-MCNC: 2.13 MG/DL (ref 0.33–1.94)
KAPPA LC FREE/LAMBDA FREE SER NEPH: 0.97 {RATIO} (ref 0.26–1.65)
LAMBDA LC FREE SERPL-MCNC: 2.2 MG/DL (ref 0.57–2.63)

## 2025-05-13 PROCEDURE — 42826 REMOVAL OF TONSILS: CPT | Performed by: OTOLARYNGOLOGY

## 2025-05-13 RX ORDER — HYDROMORPHONE HYDROCHLORIDE 1 MG/ML
INJECTION, SOLUTION INTRAMUSCULAR; INTRAVENOUS; SUBCUTANEOUS
Status: DISCONTINUED
Start: 2025-05-13 | End: 2025-05-13 | Stop reason: WASHOUT

## 2025-05-13 RX ORDER — ONDANSETRON 4 MG/1
4 TABLET, ORALLY DISINTEGRATING ORAL EVERY 4 HOURS PRN
Qty: 10 TABLET | Refills: 1 | Status: SHIPPED | OUTPATIENT
Start: 2025-05-13 | End: 2025-05-16

## 2025-05-13 RX ORDER — ACETAMINOPHEN 500 MG
1000 TABLET ORAL ONCE AS NEEDED
Status: DISCONTINUED | OUTPATIENT
Start: 2025-05-13 | End: 2025-05-13

## 2025-05-13 RX ORDER — HYDROCODONE BITARTRATE AND ACETAMINOPHEN 7.5; 325 MG/15ML; MG/15ML
15 SOLUTION ORAL EVERY 4 HOURS PRN
Qty: 500 ML | Refills: 0 | Status: SHIPPED | OUTPATIENT
Start: 2025-05-13

## 2025-05-13 RX ORDER — HYDROMORPHONE HYDROCHLORIDE 1 MG/ML
0.4 INJECTION, SOLUTION INTRAMUSCULAR; INTRAVENOUS; SUBCUTANEOUS EVERY 5 MIN PRN
Status: DISCONTINUED | OUTPATIENT
Start: 2025-05-13 | End: 2025-05-13

## 2025-05-13 RX ORDER — BUPIVACAINE HYDROCHLORIDE AND EPINEPHRINE 2.5; 5 MG/ML; UG/ML
INJECTION, SOLUTION EPIDURAL; INFILTRATION; INTRACAUDAL; PERINEURAL AS NEEDED
Status: DISCONTINUED | OUTPATIENT
Start: 2025-05-13 | End: 2025-05-13 | Stop reason: HOSPADM

## 2025-05-13 RX ORDER — ONDANSETRON 2 MG/ML
INJECTION INTRAMUSCULAR; INTRAVENOUS AS NEEDED
Status: DISCONTINUED | OUTPATIENT
Start: 2025-05-13 | End: 2025-05-13 | Stop reason: SURG

## 2025-05-13 RX ORDER — SODIUM CHLORIDE, SODIUM LACTATE, POTASSIUM CHLORIDE, CALCIUM CHLORIDE 600; 310; 30; 20 MG/100ML; MG/100ML; MG/100ML; MG/100ML
INJECTION, SOLUTION INTRAVENOUS CONTINUOUS
Status: DISCONTINUED | OUTPATIENT
Start: 2025-05-13 | End: 2025-05-13

## 2025-05-13 RX ORDER — PROCHLORPERAZINE EDISYLATE 5 MG/ML
5 INJECTION INTRAMUSCULAR; INTRAVENOUS EVERY 8 HOURS PRN
Status: DISCONTINUED | OUTPATIENT
Start: 2025-05-13 | End: 2025-05-13

## 2025-05-13 RX ORDER — HYDROCODONE BITARTRATE AND ACETAMINOPHEN 5; 325 MG/1; MG/1
2 TABLET ORAL ONCE AS NEEDED
Status: DISCONTINUED | OUTPATIENT
Start: 2025-05-13 | End: 2025-05-13

## 2025-05-13 RX ORDER — ALBUTEROL SULFATE 90 UG/1
INHALANT RESPIRATORY (INHALATION) AS NEEDED
Status: DISCONTINUED | OUTPATIENT
Start: 2025-05-13 | End: 2025-05-13 | Stop reason: SURG

## 2025-05-13 RX ORDER — ROCURONIUM BROMIDE 10 MG/ML
INJECTION, SOLUTION INTRAVENOUS AS NEEDED
Status: DISCONTINUED | OUTPATIENT
Start: 2025-05-13 | End: 2025-05-13 | Stop reason: SURG

## 2025-05-13 RX ORDER — ACETAMINOPHEN 500 MG
1000 TABLET ORAL ONCE
Status: DISCONTINUED | OUTPATIENT
Start: 2025-05-13 | End: 2025-05-13 | Stop reason: HOSPADM

## 2025-05-13 RX ORDER — DEXAMETHASONE SODIUM PHOSPHATE 4 MG/ML
VIAL (ML) INJECTION AS NEEDED
Status: DISCONTINUED | OUTPATIENT
Start: 2025-05-13 | End: 2025-05-13 | Stop reason: SURG

## 2025-05-13 RX ORDER — HYDROMORPHONE HYDROCHLORIDE 1 MG/ML
0.2 INJECTION, SOLUTION INTRAMUSCULAR; INTRAVENOUS; SUBCUTANEOUS EVERY 5 MIN PRN
Status: DISCONTINUED | OUTPATIENT
Start: 2025-05-13 | End: 2025-05-13

## 2025-05-13 RX ORDER — HYDROCODONE BITARTRATE AND ACETAMINOPHEN 5; 325 MG/1; MG/1
1 TABLET ORAL ONCE AS NEEDED
Status: DISCONTINUED | OUTPATIENT
Start: 2025-05-13 | End: 2025-05-13

## 2025-05-13 RX ORDER — NALOXONE HYDROCHLORIDE 0.4 MG/ML
0.08 INJECTION, SOLUTION INTRAMUSCULAR; INTRAVENOUS; SUBCUTANEOUS AS NEEDED
Status: DISCONTINUED | OUTPATIENT
Start: 2025-05-13 | End: 2025-05-13

## 2025-05-13 RX ORDER — HYDROCODONE BITARTRATE AND ACETAMINOPHEN 7.5; 325 MG/15ML; MG/15ML
15 SOLUTION ORAL EVERY 4 HOURS PRN
Qty: 500 ML | Refills: 0 | Status: SHIPPED | OUTPATIENT
Start: 2025-05-13 | End: 2025-05-16

## 2025-05-13 RX ORDER — SCOPOLAMINE 1 MG/3D
1 PATCH, EXTENDED RELEASE TRANSDERMAL ONCE
Status: DISCONTINUED | OUTPATIENT
Start: 2025-05-13 | End: 2025-05-13

## 2025-05-13 RX ORDER — ONDANSETRON 2 MG/ML
4 INJECTION INTRAMUSCULAR; INTRAVENOUS EVERY 6 HOURS PRN
Status: DISCONTINUED | OUTPATIENT
Start: 2025-05-13 | End: 2025-05-13

## 2025-05-13 RX ORDER — HYDROMORPHONE HYDROCHLORIDE 1 MG/ML
0.6 INJECTION, SOLUTION INTRAMUSCULAR; INTRAVENOUS; SUBCUTANEOUS EVERY 5 MIN PRN
Status: DISCONTINUED | OUTPATIENT
Start: 2025-05-13 | End: 2025-05-13

## 2025-05-13 RX ADMIN — ALBUTEROL SULFATE 2 PUFF: 90 INHALANT RESPIRATORY (INHALATION) at 11:05:00

## 2025-05-13 RX ADMIN — DEXAMETHASONE SODIUM PHOSPHATE 10 MG: 4 MG/ML VIAL (ML) INJECTION at 11:16:00

## 2025-05-13 RX ADMIN — ROCURONIUM BROMIDE 20 MG: 10 INJECTION, SOLUTION INTRAVENOUS at 11:07:00

## 2025-05-13 RX ADMIN — ONDANSETRON 4 MG: 2 INJECTION INTRAMUSCULAR; INTRAVENOUS at 11:17:00

## 2025-05-13 NOTE — INTERVAL H&P NOTE
Pre-op Diagnosis: Chronic tonsillitis [J35.01]    The above referenced H&P was reviewed by Félix Mas MD on 5/13/2025, the patient was examined and no significant changes have occurred in the patient's condition since the H&P was performed.  I discussed with the patient and/or legal representative the potential benefits, risks and side effects of this procedure; the likelihood of the patient achieving goals; and potential problems that might occur during recuperation.  I discussed reasonable alternatives to the procedure, including risks, benefits and side effects related to the alternatives and risks related to not receiving this procedure.  We will proceed with procedure as planned.

## 2025-05-13 NOTE — DISCHARGE INSTRUCTIONS
[facility]  Chet Marin MD, FACS                                        [address]  Félix [place] MD                                                  [place]  [address]  Basilio Klein MD                                                          [phone]  Figueroa Carter MD    Tonsillectomy Instructions  Call the office within several days of surgery to arrange a follow-up appointment   HEMORRHAGE (BLEEDING):  A small percentage of patients will bleed at home following a tonsillectomy. In most cases, this will not be severe and will consist of spitting up a clot of blood followed by minimal bleeding for a period of 5-10 minutes.  Please call our office immediately if bleeding lasts more than 10-15 minutes, or is profuse.  AVOID ASPIRIN OR ANY NON-STEROIDAL ANTI-INFLAMMATORY MEDICATION (examples include: ibuprofen, Motrin, Advil, Aleve, naproxen) OR HERBAL SUPPLEMENTS (especially vitamin C, fish oil & gingko,) FOR 2 WEEKS AFTER SURGERY.   No vigorous physical activity for 14 days- this can cause bleeding.  Do not gargle (rinsing the mouth and brushing teeth are OK).  Avoid coughing or vigorous throat clearing.    WHAT TO EXPECT:  Throat Pain  Throat pain may last up to 2 weeks following surgery, and it is not unusual for the pain to worsen around days 4-6 due to normal changes in the throat during the healing process.  Ear Pain  Nerves that sense throat pain are shared by those that provide sensation from the ears, so patients may sometimes feel as if the ears hurt.  Bad Breath  Bad breath is normal during the healing process.  Tooth brushing and gum chewing are permissible, but avoid gargles or mouth washes.  Low-grade fevers  The inflammatory process from the throat can sometimes produce low-grade fevers (up to 101 degrees farenheit).  If fevers over 101 are experienced, please call our office.    MEDICATIONS:  You will receive a prescription for pain  medication (usually Tylenol with codeine).  INSTEAD OF the prescription medication, patients may try over-the-counter Tylenol (acetaminophen).  Children should be dosed according to weight, as indicated on the product packaging.     DIET:  Soft foods and cool drinks are best.  DO NOT drink through a straw.  Avoid acidic, spicy, crunchy, or sharp foods (especially “the 5 P’s”: peanuts, popcorn, potato chips, pretzels and pizza)

## 2025-05-13 NOTE — OPERATIVE REPORT
[facility]  Operative Note    Usha COOK Page Location: OR   [facility] [id number] MRN [id number]   Admission Date 5/13/2025 Operation Date 5/13/2025   Attending Physician Félix Mas MD Operating Physician Félix Mas MD       OPERATIVE REPORT   PREOPERATIVE DIAGNOSIS: Chronic tonsillitis and hypertrophied tonsils.   POSTOPERATIVE DIAGNOSIS: Chronic tonsillitis and hypertrophied tonsils.   PROCEDURE PERFORMED: Tonsillectomy.   ANESTHESIA: General endotracheal.   DESCRIPTION OF PROCEDURE: After satisfactory general endotracheal anesthesia induction, the table was turned, and the patient prepared for the procedure. The Randolph-Cristobal mouth gag was placed. The soft and hard palate were palpated, inspected, and noted to be normal. A red rubber catheter was placed through the nose to retract the soft palate. Mirror inspection revealed no significant adenoid tissue.   Attention was turned to tonsillectomy. The left tonsil was grabbed with a curved Allis clamp and pulled medially. The cautery and Coblator was used to make an incision in the mucosa, then used to remove the tonsil, taking care to stay within the tonsillar capsule. The same procedure was performed on the right hand side. The mouth gag was let down.  After 5 minutes, the mouth gags were expanded. Any area suspicious for future bleeding were cauterized with the Coblator. At this point, the procedure ended. Then 0.25% Marcaine with epinephrine was injected at the tonsillar pillars. The mouth gag was removed. The patient was awakened, extubated, and transferred to the recovery room in stable condition.   FINDINGS:  hypertrophic cryptic tonsils with scar tissue peritonsillar space.  Small adenoid pad.     Félix Mas MD

## 2025-05-13 NOTE — ANESTHESIA PROCEDURE NOTES
Airway  Date/Time: 5/13/2025 11:12 AM  Reason: elective    Airway not difficult    General Information and Staff   Patient location during procedure: OR  Anesthesiologist: Figueroa Betts DO  Performed: anesthesiologist   Performed by: Figueroa Betts DO  Authorized by: Figueroa Betts DO        Indications and Patient Condition  Indications for airway management: anesthesia  Sedation level: deep      Preoxygenated: yesPatient position: sniffing    Mask difficulty assessment: 2 - vent by mask + OA or adjuvant +/- NMBA    Final Airway Details    Final airway type: endotracheal airway    Successful airway: ETT  Cuffed: yes   Successful intubation technique: direct laryngoscopy  Facilitating devices/methods: intubating stylet  Endotracheal tube insertion site: oral  Blade: Doug  Blade size: #3  ETT size (mm): 7.0    Cormack-Lehane Classification: grade IIB - view of arytenoids or posterior of glottis only  Placement verified by: capnometry   Measured from: lips  ETT to lips (cm): 22  Number of attempts at approach: 1  Number of other approaches attempted: 0

## 2025-05-13 NOTE — ANESTHESIA PREPROCEDURE EVALUATION
PRE-OP EVALUATION    Patient Name: Usha COOK Page    Admit Diagnosis: Chronic tonsillitis [J35.01]    Pre-op Diagnosis: Chronic tonsillitis [J35.01]    Bilateral Tonsillectomy    Anesthesia Procedure: Bilateral Tonsillectomy (Bilateral: Mouth)    Surgeons and Role:     * Félix Mas MD - Primary    Pre-op vitals reviewed.  Temp: 98.8 °F (37.1 °C)  Pulse: 95  Resp: 16  BP: 126/86  SpO2: 99 %  Body mass index is 26.63 kg/m².    Current medications reviewed.  Hospital Medications:  Current Medications[1]    Outpatient Medications:   Prescriptions Prior to Admission[2]    Allergies: Patient has no known allergies.      Anesthesia Evaluation    Patient summary reviewed.    Anesthetic Complications  (-) history of anesthetic complications         GI/Hepatic/Renal      (+) GERD                           Cardiovascular  Comment: Patient denies hx of chest pain/tightness, MI, or cardiac disease.        Exercise tolerance: good     MET: >4                             (-) angina     (-) [place]         Endo/Other                                  Pulmonary      (+) asthma         (-) shortness of breath     (-) sleep apnea       Neuro/Psych      (+) depression  (+) anxiety           (+) headaches                   Past Surgical History[3]  Social Hx on file[4]  History   Drug Use Unknown     Available pre-op labs reviewed.               Airway      Mallampati: II  Mouth opening: 3 FB  TM distance: 4 - 6 cm  Neck ROM: full Cardiovascular    Cardiovascular exam normal.  Rhythm: regular  Rate: normal     Dental    Dentition appears grossly intact         Pulmonary    Pulmonary exam normal.  Breath sounds clear to auscultation bilaterally.               Other findings              ASA: 2   Plan: general  NPO status verified and patient meets guidelines.    Post-procedure pain management plan discussed with surgeon and patient.      Plan/risks discussed with: patient                Present on Admission:  **None**             [1]     [Transfer Hold] acetaminophen (Tylenol Extra Strength) tab 1,000 mg  1,000 mg Oral Once    scopolamine (Transderm-Scop) 1 MG/3DAYS patch 1 patch  1 patch Transdermal Once    lactated ringers infusion   Intravenous Continuous   [2]   Medications Prior to Admission   Medication Sig Dispense Refill Last Dose/Taking    albuterol 108 (90 Base) MCG/ACT Inhalation Aero Soln Inhale 1 puff into the lungs every 6 (six) hours as needed for Wheezing or Shortness of Breath.   Taking As Needed    busPIRone 5 MG Oral Tab Take 1 tablet (5 mg total) by mouth every 12 (twelve) hours as needed.   Past Month    DULoxetine 30 MG Oral Cap DR Particles Take 1 capsule (30 mg total) by mouth in the morning.   5/12/2025 Evening    cyclobenzaprine 5 MG Oral Tab Take 1-2 tablets (5-10 mg total) by mouth 2 (two) times daily as needed for Muscle spasms. 30 tablet 0 More than a month    naproxen 500 MG Oral Tab Take 1 tablet (500 mg total) by mouth 2 (two) times daily as needed (pain). 60 tablet 0 More than a month    Doxycycline Hyclate 20 MG Oral Tab Take 1.5 tablets (30 mg total) by mouth daily as needed.   More than a month    doxycycline in sodium chloride 0.9% 500 mg/50mL Intrapleural Solution       [3]   Past Surgical History:  Procedure Laterality Date    Dental surgery procedure      [place] teeth removed     [4]   Social History  Socioeconomic History    Marital status: Single   Occupational History    Occupation: Student     Comment: [employer] childhood   Tobacco Use    Smoking status: Never    Smokeless tobacco: Never   Vaping Use    Vaping status: Never Used   Substance and Sexual Activity    Alcohol use: Yes     Alcohol/week: 4.0 - 5.0 standard drinks of alcohol     Types: 4 - 5 Standard drinks or equivalent per week     Comment: Socially    Drug use: Never    Sexual activity: Not Currently     Partners: Male     Birth control/protection: OCP   Other Topics Concern    Caffeine Concern Yes     Comment: 1 coffee per day    Stress  Concern No    Weight Concern No    Special Diet No    Exercise Yes     Comment: 5 days per week    Seat Belt Yes

## 2025-05-13 NOTE — ANESTHESIA POSTPROCEDURE EVALUATION
[facility]    Usha V Page Patient Status:  Hospital Outpatient Surgery   Age/Gender 24 year old female MRN [id number]   Location [facility] POST ANESTHESIA CARE UNIT Attending Félix Mas MD   Hosp Day # 0 PCP Vivian Hernandez MD       Anesthesia Post-op Note    Bilateral Tonsillectomy    Procedure Summary       Date: 05/13/25 Room / Location:  MAIN OR 02 / [facility] MAIN OR    Anesthesia Start: 1103 Anesthesia Stop: 1150    Procedure: Bilateral Tonsillectomy (Bilateral: Mouth) Diagnosis:       Chronic tonsillitis      (Chronic tonsillitis [J35.01])    Surgeons: Félix Mas MD Anesthesiologist: Figueroa Betts DO    Anesthesia Type: general ASA Status: 2            Anesthesia Type: general    Vitals Value Taken Time   /88 05/13/25 11:53   Temp 97.6 05/13/25 11:53   Pulse 108 05/13/25 11:53   Resp 20 05/13/25 11:53   SpO2 100 05/13/25 11:53           Patient Location: PACU    Anesthesia Type: general    Airway Patency: patent and extubated    Postop Pain Control: inadequate, being treated    Mental Status: mildly sedated but able to meaningfully participate in the post-anesthesia evaluation    Nausea/Vomiting: none    Cardiopulmonary/Hydration status: stable euvolemic    Complications: no apparent anesthesia related complications    Postop vital signs: stable    Dental Exam: Unchanged from Preop    Patient to be discharged from PACU when criteria met.

## 2025-05-14 ENCOUNTER — TELEPHONE (OUTPATIENT)
Facility: LOCATION | Age: 25
End: 2025-05-14

## 2025-05-14 LAB

## 2025-05-14 NOTE — TELEPHONE ENCOUNTER
Pt had tonsil surgery on 05/13/25. Pt's mother is calling on behalf of the Pt due to Pt concerns of the amount of OTC Tylenol she's be taking. Pt's Mother stated that they were advise upon discharged to take the OTC Tylenol every 4 hours and the HYDROcodone as needed. Pt has only been taking OTC PainQuil Pain relief medication 30mL every 4 hours. Pt is worried about safety concerns of taking the 30mL dosage every 4 hours due to package label on the PainQuil. Please advise.

## 2025-05-15 ENCOUNTER — TELEPHONE (OUTPATIENT)
Facility: LOCATION | Age: 25
End: 2025-05-15

## 2025-05-15 LAB
ALBUMIN SERPL ELPH-MCNC: 4.06 G/DL (ref 3.75–5.21)
ALBUMIN/GLOB SERPL: 1.38 {RATIO} (ref 1–2)
ALPHA1 GLOB SERPL ELPH-MCNC: 0.28 G/DL (ref 0.19–0.46)
ALPHA2 GLOB SERPL ELPH-MCNC: 0.82 G/DL (ref 0.48–1.05)
B-GLOBULIN SERPL ELPH-MCNC: 0.85 G/DL (ref 0.68–1.23)
GAMMA GLOB SERPL ELPH-MCNC: 0.99 G/DL (ref 0.62–1.7)
PROT SERPL-MCNC: 7 G/DL (ref 5.7–8.2)
VITAMIN B6: 10.9 UG/L

## 2025-05-15 NOTE — TELEPHONE ENCOUNTER
Pts mom called and said that pt ate an egg and that a piece of egg got stuck in the tonsil. Pts mom has been giving water to try and wash it down but it remains stuck. Pts mom is asking if this is a problem. Please advise. Best call back number is [phone].

## 2025-05-16 ENCOUNTER — PATIENT MESSAGE (OUTPATIENT)
Facility: LOCATION | Age: 25
End: 2025-05-16

## 2025-05-16 ENCOUNTER — TELEPHONE (OUTPATIENT)
Facility: LOCATION | Age: 25
End: 2025-05-16

## 2025-05-16 DIAGNOSIS — J35.01 CHRONIC TONSILLITIS: ICD-10-CM

## 2025-05-16 LAB
ALLERGEN BRAZIL NUT: <0.1 KUA/L (ref ?–0.1)
ALMOND IGE QN: <0.1 KUA/L (ref ?–0.1)
CASHEW NUT IGE QN: <0.1 KUA/L (ref ?–0.1)
CLAM IGE QN: <0.1 KUA/L (ref ?–0.1)
CODFISH IGE QN: <0.1 KUA/L (ref ?–0.1)
CORN IGE QN: <0.1 KUA/L (ref ?–0.1)
COW MILK IGE QN: <0.1 KUA/L (ref ?–0.1)
DSDNA IGG SERPL IA-ACNC: 1.3 IU/ML (ref ?–10)
EGG WHITE IGE QN: <0.1 KUA/L (ref ?–0.1)
ENA RNP IGG SER IA-ACNC: 1.6 U/ML (ref ?–7)
ENA SM IGG SER IA-ACNC: <0.7 U/ML (ref ?–7)
ENA SS-A IGG SER IA-ACNC: 0.4 U/ML (ref ?–7)
ENA SS-B IGG SER IA-ACNC: <0.4 U/ML (ref ?–7)
GLUTEN IGE QN: <0.1 KUA/L (ref ?–0.1)
HAZELNUT IGE QN: <0.1 KUA/L (ref ?–0.1)
IGE SERPL-ACNC: 5.89 KU/L (ref 2–214)
PEANUT IGE QN: <0.1 KUA/L (ref ?–0.1)
SALMON IGE QN: <0.1 KUA/L (ref ?–0.1)
SCALLOP IGE QN: <0.1 KUA/L (ref ?–0.1)
SESAME SEED IGE QN: <0.1 KUA/L (ref ?–0.1)
SHRIMP IGE QN: <0.1 KUA/L (ref ?–0.1)
SOYBEAN IGE QN: <0.1 KUA/L (ref ?–0.1)
U1 SNRNP IGG SER IA-ACNC: 2.6 U/ML (ref ?–5)
WALNUT IGE QN: <0.1 KUA/L (ref ?–0.1)
WHEAT IGE QN: 0.1 KUA/L (ref ?–0.1)

## 2025-05-16 RX ORDER — HYDROCODONE BITARTRATE AND ACETAMINOPHEN 7.5; 325 MG/15ML; MG/15ML
15 SOLUTION ORAL EVERY 4 HOURS PRN
Qty: 500 ML | Refills: 0 | Status: SHIPPED | OUTPATIENT
Start: 2025-05-16

## 2025-05-16 RX ORDER — ONDANSETRON 4 MG/1
4 TABLET, ORALLY DISINTEGRATING ORAL EVERY 4 HOURS PRN
Qty: 10 TABLET | Refills: 1 | Status: SHIPPED | OUTPATIENT
Start: 2025-05-16 | End: 2025-05-23

## 2025-05-16 NOTE — TELEPHONE ENCOUNTER
Per MD Crawford Pt needs refill of HYDRO-codone & Zofran. Staff attempted to reach out to Pt before MD left for the day if they needed these refills. Pt had [facility] messaged back for the refills. MD called the office asking if doctor who is currently here can fill these scripts for his Pt. Pharmacy has been confirmed. Medication refill has been attached to this encounter, please sign off. Thank you!

## 2025-05-16 NOTE — TELEPHONE ENCOUNTER
Called patient and patients mother lvm on both numbers, Dr Mas would like to know if a refill on pain medication is needed prior to the weekend coming up.

## 2025-05-17 ENCOUNTER — TELEPHONE (OUTPATIENT)
Facility: LOCATION | Age: 25
End: 2025-05-17

## 2025-05-17 DIAGNOSIS — J03.90 TONSILLITIS: Primary | ICD-10-CM

## 2025-05-17 DIAGNOSIS — Z90.89 S/P TONSILLECTOMY: ICD-10-CM

## 2025-05-17 RX ORDER — METHYLPREDNISOLONE 4 MG/1
TABLET ORAL
Qty: 21 TABLET | Refills: 0 | Status: SHIPPED | OUTPATIENT
Start: 2025-05-17

## 2025-05-17 RX ORDER — TRAMADOL HYDROCHLORIDE 50 MG/1
50 TABLET ORAL EVERY 6 HOURS PRN
Qty: 25 TABLET | Refills: 1 | Status: SHIPPED | OUTPATIENT
Start: 2025-05-17

## 2025-05-17 NOTE — TELEPHONE ENCOUNTER
Called and spoke with pt's mom. Reports pt has pruritic rash and has only been taking tylenol. Denies fever and confusion, normal mentation. Prescribed tramadol and medrol dosepak. Return precautions provided.     Genevieve Potts MD, FREDERIC  Facial Plastic & Reconstructive Surgery, Otolaryngology-Head & Neck Surgery  [facility]

## 2025-05-19 ENCOUNTER — TELEPHONE (OUTPATIENT)
Facility: LOCATION | Age: 25
End: 2025-05-19

## 2025-05-21 ENCOUNTER — OFFICE VISIT (OUTPATIENT)
Facility: LOCATION | Age: 25
End: 2025-05-21
Payer: COMMERCIAL

## 2025-05-21 DIAGNOSIS — Z90.89 S/P TONSILLECTOMY: Primary | ICD-10-CM

## 2025-05-21 PROCEDURE — 99024 POSTOP FOLLOW-UP VISIT: CPT | Performed by: OTOLARYNGOLOGY

## 2025-05-21 NOTE — PROGRESS NOTES
Usha Rouse is a 24 year old female. No chief complaint on file.    HPI:   She is postop tonsillectomy doing well.  She is having no bleeding or infection.  Her pain is starting to improve.    REVIEW OF SYSTEMS:   GENERAL HEALTH: feels well otherwise  GENERAL : denies fever, chills, sweats, weight loss, weight gain  SKIN: denies any unusual skin lesions or rashes  RESPIRATORY: denies shortness of breath with exertion  NEURO: denies headaches    EXAM:   LMP 04/29/2025 (Exact Date)     System Findings Details   Constitutional  Overall appearance - Normal.   Psychiatric  Orientation - Oriented to time, place, person & situation. Appropriate mood and affect.   Head/Face  Facial features -- Normal. Skull - Normal.   Eyes  Pupils equal ,round ,react to light and accomidate   Ears, Nose, Throat, Neck  Tonsil beds are healing well without signs of bleeding infection.   Neurological  Memory - Normal. Cranial nerves - Cranial nerves II through XII grossly intact.   Lymph Detail  Submental. Submandibular. Anterior cervical. Posterior cervical. Supraclavicular.       ASSESSMENT AND PLAN:   1. S/P tonsillectomy  Stable postop tonsillectomy 1 week ago.  She will advance her diet.  She will refrain from vigorous activity for 1 more week.      The patient indicates understanding of these issues and agrees to the plan.    No follow-ups on file.    Félix Mas MD  5/21/2025  12:25 PM

## 2025-05-22 ENCOUNTER — MED REC SCAN ONLY (OUTPATIENT)
Facility: LOCATION | Age: 25
End: 2025-05-22

## 2025-06-04 ENCOUNTER — PATIENT MESSAGE (OUTPATIENT)
Dept: FAMILY MEDICINE CLINIC | Facility: CLINIC | Age: 25
End: 2025-06-04

## 2025-06-16 ENCOUNTER — OFFICE VISIT (OUTPATIENT)
Dept: FAMILY MEDICINE CLINIC | Facility: CLINIC | Age: 25
End: 2025-06-16
Payer: COMMERCIAL

## 2025-06-16 VITALS
RESPIRATION RATE: 16 BRPM | OXYGEN SATURATION: 98 % | BODY MASS INDEX: 27.16 KG/M2 | WEIGHT: 163 LBS | DIASTOLIC BLOOD PRESSURE: 80 MMHG | HEIGHT: 65 IN | TEMPERATURE: 97 F | SYSTOLIC BLOOD PRESSURE: 120 MMHG | HEART RATE: 70 BPM

## 2025-06-16 DIAGNOSIS — Z00.00 PHYSICAL EXAM, ANNUAL: Primary | ICD-10-CM

## 2025-06-16 DIAGNOSIS — R63.5 WEIGHT GAIN: ICD-10-CM

## 2025-06-16 DIAGNOSIS — Z00.00 LABORATORY TESTS ORDERED AS PART OF A COMPLETE PHYSICAL EXAM (CPE): ICD-10-CM

## 2025-06-16 DIAGNOSIS — N92.1 MENORRHAGIA WITH IRREGULAR CYCLE: ICD-10-CM

## 2025-06-16 DIAGNOSIS — R53.83 FATIGUE, UNSPECIFIED TYPE: ICD-10-CM

## 2025-06-16 DIAGNOSIS — Z11.1 SCREENING FOR TUBERCULOSIS: ICD-10-CM

## 2025-06-16 NOTE — PROGRESS NOTES
HPI:   Usha Rouse is a 24 year old female who presents for a complete physical exam.     The following individual(s) verbally consented to be recorded using ambient AI listening technology and understand that they can each withdraw their consent to this listening technology at any point by asking the clinician to turn off or pause the recording:    Patient name: Usha Rouse    History of Present Illness  Usha Rouse is a 24 year old female who presents for an annual physical exam and evaluation of irregular menstrual cycles.    She has experienced irregular menstrual cycles over the past couple of years, with her last period occurring two days ago after a three-month absence. Her periods are described as 'horrible and really, really heavy,' often requiring 'ultra tampons' and significant bleeding overnight. The bleeding is worse after a prolonged absence of menstruation. She was previously placed on birth control for three months to regulate her cycles but experienced continuous bleeding during that time, leading to discontinuation. She has not been on any hormonal treatment since then. There is a history of high testosterone levels, and she was previously evaluated for PCOS, though no definitive diagnosis was made.    She experiences difficulty losing weight despite regular exercise and a healthy diet, with her weight fluctuating between 160 and 164 pounds. She reports symptoms of hair breakage, which her hairstylist suggested might be related to an internal issue. She has not colored her hair in a year and has tried various remedies without success.    She experiences night sweats, which returned about a month ago after a period of absence. She also reports feeling tired and fatigued, with a pending appointment with a sleep specialist.    She has a history of perioral dermatitis, for which she takes doxycycline during flare-ups, particularly when exacerbated by sun exposure. She is currently out of  medication and plans to contact her dermatologist for a refill.    She is a first- and is not currently sexually active. Family history is significant for diabetes. No respiratory symptoms, excessive tiredness, or cough. Reports night sweats, fatigue, and heavy menstrual bleeding.     Symptoms: denies discharge, itching, burning or dysuria.  Patient is new for me today.  Patient's parents are patients in our office.    Patient has a history of TMJ had arthrocentesis done using cyclobenzaprine anti-inflammatory medication as needed.    She saw neurologist regarding migraines Dr. Ocasio.    Immunization History   Administered Date(s) Administered    Hpv Virus Vaccine 9 Ragini Im 02/03/2021, 05/20/2021    Influenza 10/27/2021    Meningococcal-Menactra 08/20/2020    Serogroup B Meningococcal 3 Dose 08/31/2017, 09/04/2018   Pended Date(s) Pended    TDAP 06/16/2025      Wt Readings from Last 6 Encounters:   06/16/25 163 lb (73.9 kg)   05/13/25 160 lb (72.6 kg)   05/12/25 161 lb (73 kg)   04/20/25 161 lb 3.2 oz (73.1 kg)   12/19/24 161 lb (73 kg)   11/29/24 161 lb 2 oz (73.1 kg)     Body mass index is 27.12 kg/m².     Cholesterol, Total (mg/dL)   Date Value   12/19/2024 183   05/24/2022 175     HDL Cholesterol (mg/dL)   Date Value   12/19/2024 76 (H)   05/24/2022 49     LDL Cholesterol (mg/dL)   Date Value   12/19/2024 88   05/24/2022 108 (H)     AST (U/L)   Date Value   12/19/2024 24   01/11/2023 14 (L)   05/24/2022 16     ALT (U/L)   Date Value   12/19/2024 19   01/11/2023 15   05/24/2022 13      No results found for: \"GLUCOSE\"     Current Outpatient Medications   Medication Sig Dispense Refill    albuterol 108 (90 Base) MCG/ACT Inhalation Aero Soln Inhale 1 puff into the lungs every 6 (six) hours as needed for Wheezing or Shortness of Breath.      cyclobenzaprine 5 MG Oral Tab Take 1-2 tablets (5-10 mg total) by mouth 2 (two) times daily as needed for Muscle spasms. 30 tablet 0    busPIRone 5 MG Oral Tab  Take 1 tablet (5 mg total) by mouth every 12 (twelve) hours as needed.      DULoxetine 30 MG Oral Cap DR Particles Take 1 capsule (30 mg total) by mouth in the morning.      methylPREDNISolone 4 MG Oral Tablet Therapy Pack Take by oral route. 21 tablet 0    HYDROcodone-acetaminophen 7.5-325 MG/15ML Oral Solution Take 15 mL by mouth every 4 (four) hours as needed. 500 mL 0      Past Medical History:    Allergic rhinitis    Anemia    Anxiety    Asthma (HCC)    Depression    Esophageal reflux    Hx of motion sickness    Migraines    Pneumonia due to organism      Past Surgical History:   Procedure Laterality Date    Dental surgery procedure      Elma teeth removed        Family History   Problem Relation Age of Onset    Depression Mother     Anemia Mother     Asthma Mother     Diabetes Maternal Grandmother     Diabetes Maternal Grandfather     Diabetes Maternal Aunt     Diabetes Maternal Uncle       Social History:   Social History     Socioeconomic History    Marital status: Single   Occupational History    Occupation: Student     Comment: [employer] childhood   Tobacco Use    Smoking status: Never    Smokeless tobacco: Never   Vaping Use    Vaping status: Never Used   Substance and Sexual Activity    Alcohol use: Yes     Alcohol/week: 4.0 - 5.0 standard drinks of alcohol     Types: 4 - 5 Standard drinks or equivalent per week     Comment: Socially    Drug use: Never    Sexual activity: Not Currently     Partners: Male     Birth control/protection: OCP   Other Topics Concern    Caffeine Concern Yes     Comment: 1 coffee per day    Stress Concern No    Weight Concern No    Special Diet No    Exercise Yes     Comment: 5 days per week    Seat Belt Yes     Social Drivers of Health     Food Insecurity: No Food Insecurity (6/16/2025)    NCSS - Food Insecurity     Worried About Running Out of Food in the Last Year: No     Ran Out of Food in the Last Year: No   Transportation Needs: No Transportation Needs (6/16/2025)    NCSS -  Transportation     Lack of Transportation: No   Housing Stability: Not At Risk (6/16/2025)    NCSS - Housing/Utilities     Has Housing: Yes     Worried About Losing Housing: No     Unable to Get Utilities: No     Occ:student. : no Children: none  Exercise: walking.  Diet: watches calories closely     REVIEW OF SYSTEMS:   GENERAL: feels well otherwise excessive tiredness   SKIN: denies any unusual skin lesions  EYES:denies blurred vision or double vision  HEENT: denies nasal congestion, sinus pain, has sore throat  LUNGS: denies shortness of breath with exertion  CARDIOVASCULAR: denies chest pain on exertion  GI: denies abdominal pain,denies heartburn  : denies dysuria, vaginal discharge or itching,periods  are heavy and irregular.    MUSCULOSKELETAL: denies back pain,  NEURO: denies headaches  PSYCHE: denies depression or anxiety  HEMATOLOGIC: denies hx of anemia  ENDOCRINE: denies thyroid history  ALL/ASTHMA: denies hx of allergy or asthma    EXAM:   /80 (BP Location: Left arm, Patient Position: Sitting, Cuff Size: adult)   Pulse 70   Temp 97 °F (36.1 °C) (Temporal)   Resp 16   Ht 5' 5\" (1.651 m)   Wt 163 lb (73.9 kg)   LMP 06/14/2025 (Exact Date)   SpO2 98%   BMI 27.12 kg/m²   Body mass index is 27.12 kg/m².   GENERAL: well developed, well nourished,in no apparent distress  SKIN: no rashes,no suspicious lesions  HEENT: atraumatic, normocephalic,ears and throat are clear  EYES:PERRLA, EOMI, conjunctiva are clear  NECK: supple,no adenopathy,  CHEST: no chest tenderness  BREAST: Done by OB/GYN.  LUNGS: clear to auscultation  CARDIO: RRR without murmur  GI: good BS's,no masses, HSM or tenderness   done by OB/GYN  RECTAL: Deferred  MUSCULOSKELETAL: back is not tender,FROM of the back  EXTREMITIES: no cyanosis, clubbing or edema  NEURO: Oriented times three,cranial nerves are intact,motor and sensory are grossly intact    Results for orders placed or performed during the hospital encounter of  05/13/25   POCT Pregnancy, Urine    Collection Time: 05/13/25  9:14 AM   Result Value Ref Range    POCT Urine Pregnancy Negative Negative   Specimen to Pathology Tissue    Collection Time: 05/13/25 11:21 AM   Result Value Ref Range    Case Report       Surgical Pathology                                Case: [id number]                                   Authorizing Provider:  Félix Mas MD     Collected:           05/13/2025 11:21 AM          Ordering Location:     [facility]    Received:            05/13/2025 03:19 PM          Pathologist:           Juliana Crouch                                                            Specimens:   A) - Tonsil, right                                                                                  B) - Tonsil, left                                                                          Final Diagnosis:       A. Tonsil, right, tonsillectomy:  -Hornitos tonsils with chronic tonsillitis.  -Accumulated keratotic and bacterial debris in the tonsillar crypts, with acute inflammation.    B. Tonsil, left, tonsillectomy:  -Hornitos tonsils with chronic tonsillitis.  -Accumulated keratotic and bacterial debris in the tonsillar crypts, with acute inflammation.      Embedded Images      Clinical Information       J35.01 Chronic Tonsillitis.         Gross Description       A. Labeled with the patient's name, MRN, and \"tonsil, right\"  Received in formalin: 4 g, 3.0 x 2.1 x 1.4 cm rubbery, hyperemic, red-brown to pink-tan tonsil.  The specimen is serially sectioned to reveal a rubbery, pink-tan cut surface with the usual cryptic architecture.  A representative section is submitted in A1.    B. Labeled with the patient's name, MRN, and \"tonsil, left\"  Received in formalin: 5 g, 3.3 x 2.4 x 1.8 cm hyperemic, red-brown to pink-tan tonsil.  The specimen is serially sectioned to reveal a rubbery, pink-tan cut surface with the usual cryptic architecture.  A representative  section is submitted in B1.    (LESLEE)      Interpretation Benign        ASSESSMENT AND PLAN:   Usha Rouse is a 24 year old female who presents for a complete physical exam.   Encounter Diagnoses   Name Primary?    Physical exam, annual Yes    Menorrhagia with irregular cycle     Weight gain     Laboratory tests ordered as part of a complete physical exam (CPE)     Fatigue, unspecified type     Screening for tuberculosis        Orders Placed This Encounter   Procedures    TSH W Reflex To Free T4    Lipid Panel    Comp Metabolic Panel (14)    CBC With Differential With Platelet    UA/M With Culture Reflex [E]    Testosterone,Free And Total (Female/Child) [E]    Cortisol [E]    17-ALPHA-HYDROXYPROGESTERONE [60450][Q]    Dehydroepiandrosterone Sulfate [E]    Estradiol [E]    LH (Luteinizing Hormone) [E]    FSH [E]    Vitamin B12 [E]    Quantiferon TB Plus    TdaP (Adacel, Boostrix) [84049]       Meds & Refills for this Visit:  Requested Prescriptions      No prescriptions requested or ordered in this encounter   Healthy diet.  Stay active.   Call [phone] to schedule fasting blood work.  Further recommendation pending test results.    VISIT SUMMARY:  Today, you had your annual physical exam and discussed your irregular menstrual cycles, heavy periods, and other symptoms such as night sweats and fatigue. We also reviewed your history of perioral dermatitis and general health maintenance needs.    YOUR PLAN:  -GENERAL HEALTH MAINTENANCE: You are due for a tetanus booster and need to complete your HPV vaccination series. We discussed the importance of maintaining a healthy lifestyle, including regular physical activity, a balanced diet, and staying hydrated, especially with your teaching responsibilities. We will administer the tetanus booster today and advise you to complete the HPV vaccination series.    -IRREGULAR MENSTRUAL CYCLE: You have been experiencing irregular menstrual cycles with heavy bleeding after  prolonged absences. This could be due to hormonal imbalances or other endocrine disorders. We will perform a hormone panel to check your estrogen, progesterone, cortisol, and testosterone levels. Based on the results, we may refer you to an endocrinologist. We will also monitor your menstrual cycle patterns and symptoms. Additionally, we will check your vitamin B12 levels to see if a deficiency is contributing to your fatigue. Please follow up with your sleep specialist in September.    -PERIORAL DERMATITIS: You have flare-ups of perioral dermatitis, especially when exposed to the sun. Please contact your dermatologist for a refill of doxycycline and further advice.    INSTRUCTIONS:  Please complete the blood work, including the hormone panel, vitamin B12, and TB testing. Remember to fast before the blood tests. Follow up with your sleep specialist in September. Arrange to receive your test results either at home or at your school.    Contains text generated by Mike       Patient brought work physical form which will be filled out after we have the TB testing    Imaging & Consults:  TETANUS, DIPHTHERIA TOXOIDS AND ACELLULAR PERTUSIS VACCINE (TDAP), >7 YEARS, IM USE  Pap and pelvic done. Order put in for mammogram and dexascan. Self breast exam explained. Health maintenance, will check fasting Lipids, CMP, and CBC. Pt referred for screening colonoscopy. Pt' s weight is Body mass index is 27.12 kg/m²., recommended low carb diet and aerobic exercise 30 minutes three times weekly.  The patient indicates understanding of these issues and agrees to the plan.  The patient is asked to return for CPX in year.  Follow-up after test results as needed.

## 2025-06-17 ENCOUNTER — LAB ENCOUNTER (OUTPATIENT)
Dept: LAB | Age: 25
End: 2025-06-17
Attending: FAMILY MEDICINE
Payer: COMMERCIAL

## 2025-06-17 DIAGNOSIS — Z11.1 SCREENING FOR TUBERCULOSIS: ICD-10-CM

## 2025-06-17 DIAGNOSIS — R63.5 WEIGHT GAIN: ICD-10-CM

## 2025-06-17 DIAGNOSIS — N92.1 MENORRHAGIA WITH IRREGULAR CYCLE: ICD-10-CM

## 2025-06-17 DIAGNOSIS — Z00.00 LABORATORY TESTS ORDERED AS PART OF A COMPLETE PHYSICAL EXAM (CPE): ICD-10-CM

## 2025-06-17 DIAGNOSIS — R53.83 FATIGUE, UNSPECIFIED TYPE: ICD-10-CM

## 2025-06-17 LAB
ALBUMIN SERPL-MCNC: 4.8 G/DL (ref 3.2–4.8)
ALBUMIN/GLOB SERPL: 1.8 {RATIO} (ref 1–2)
ALP LIVER SERPL-CCNC: 68 U/L (ref 52–144)
ALT SERPL-CCNC: 11 U/L (ref 10–49)
ANION GAP SERPL CALC-SCNC: 11 MMOL/L (ref 0–18)
AST SERPL-CCNC: 20 U/L (ref ?–34)
BASOPHILS # BLD AUTO: 0.02 X10(3) UL (ref 0–0.2)
BASOPHILS NFR BLD AUTO: 0.3 %
BILIRUB SERPL-MCNC: 0.7 MG/DL (ref 0.3–1.2)
BILIRUB UR QL STRIP.AUTO: NEGATIVE
BUN BLD-MCNC: 11 MG/DL (ref 9–23)
CALCIUM BLD-MCNC: 9.3 MG/DL (ref 8.7–10.6)
CHLORIDE SERPL-SCNC: 103 MMOL/L (ref 98–112)
CHOLEST SERPL-MCNC: 180 MG/DL (ref ?–200)
CLARITY UR REFRACT.AUTO: CLEAR
CO2 SERPL-SCNC: 26 MMOL/L (ref 21–32)
COLOR UR AUTO: YELLOW
CORTIS SERPL-MCNC: 14 UG/DL
CREAT BLD-MCNC: 0.9 MG/DL (ref 0.55–1.02)
DHEA-S SERPL-MCNC: 369.5 UG/DL (ref 25.9–460.2)
EGFRCR SERPLBLD CKD-EPI 2021: 93 ML/MIN/1.73M2 (ref 60–?)
EOSINOPHIL # BLD AUTO: 0.2 X10(3) UL (ref 0–0.7)
EOSINOPHIL NFR BLD AUTO: 3 %
ERYTHROCYTE [DISTWIDTH] IN BLOOD BY AUTOMATED COUNT: 12.7 %
ESTRADIOL SERPL-MCNC: 55.4 PG/ML
FASTING PATIENT LIPID ANSWER: YES
FASTING STATUS PATIENT QL REPORTED: YES
FSH SERPL-ACNC: 8.1 MIU/ML
GLOBULIN PLAS-MCNC: 2.7 G/DL (ref 2–3.5)
GLUCOSE BLD-MCNC: 81 MG/DL (ref 70–99)
GLUCOSE UR STRIP.AUTO-MCNC: NORMAL MG/DL
HCT VFR BLD AUTO: 39.8 % (ref 35–48)
HDLC SERPL-MCNC: 73 MG/DL (ref 40–59)
HGB BLD-MCNC: 13.1 G/DL (ref 12–16)
IMM GRANULOCYTES # BLD AUTO: 0.02 X10(3) UL (ref 0–1)
IMM GRANULOCYTES NFR BLD: 0.3 %
KETONES UR STRIP.AUTO-MCNC: NEGATIVE MG/DL
LDLC SERPL CALC-MCNC: 93 MG/DL (ref ?–100)
LEUKOCYTE ESTERASE UR QL STRIP.AUTO: NEGATIVE
LH SERPL-ACNC: 8.7 MIU/ML
LYMPHOCYTES # BLD AUTO: 2.63 X10(3) UL (ref 1–4)
LYMPHOCYTES NFR BLD AUTO: 39.9 %
MCH RBC QN AUTO: 29.9 PG (ref 26–34)
MCHC RBC AUTO-ENTMCNC: 32.9 G/DL (ref 31–37)
MCV RBC AUTO: 90.9 FL (ref 80–100)
MONOCYTES # BLD AUTO: 0.48 X10(3) UL (ref 0.1–1)
MONOCYTES NFR BLD AUTO: 7.3 %
NEUTROPHILS # BLD AUTO: 3.24 X10 (3) UL (ref 1.5–7.7)
NEUTROPHILS # BLD AUTO: 3.24 X10(3) UL (ref 1.5–7.7)
NEUTROPHILS NFR BLD AUTO: 49.2 %
NITRITE UR QL STRIP.AUTO: NEGATIVE
NONHDLC SERPL-MCNC: 107 MG/DL (ref ?–130)
OSMOLALITY SERPL CALC.SUM OF ELEC: 288 MOSM/KG (ref 275–295)
PH UR STRIP.AUTO: 6 [PH] (ref 5–8)
PLATELET # BLD AUTO: 323 10(3)UL (ref 150–450)
POTASSIUM SERPL-SCNC: 4.2 MMOL/L (ref 3.5–5.1)
PROT SERPL-MCNC: 7.5 G/DL (ref 5.7–8.2)
PROT UR STRIP.AUTO-MCNC: NEGATIVE MG/DL
RBC # BLD AUTO: 4.38 X10(6)UL (ref 3.8–5.3)
SODIUM SERPL-SCNC: 140 MMOL/L (ref 136–145)
SP GR UR STRIP.AUTO: 1.02 (ref 1–1.03)
TRIGL SERPL-MCNC: 73 MG/DL (ref 30–149)
TSI SER-ACNC: 1.06 UIU/ML (ref 0.55–4.78)
UROBILINOGEN UR STRIP.AUTO-MCNC: NORMAL MG/DL
VIT B12 SERPL-MCNC: 562 PG/ML (ref 211–911)
VLDLC SERPL CALC-MCNC: 12 MG/DL (ref 0–30)
WBC # BLD AUTO: 6.6 X10(3) UL (ref 4–11)

## 2025-06-17 PROCEDURE — 82627 DEHYDROEPIANDROSTERONE: CPT

## 2025-06-17 PROCEDURE — 82670 ASSAY OF TOTAL ESTRADIOL: CPT

## 2025-06-17 PROCEDURE — 83002 ASSAY OF GONADOTROPIN (LH): CPT

## 2025-06-17 PROCEDURE — 84410 TESTOSTERONE BIOAVAILABLE: CPT

## 2025-06-17 PROCEDURE — 86480 TB TEST CELL IMMUN MEASURE: CPT

## 2025-06-17 PROCEDURE — 84143 ASSAY OF 17-HYDROXYPREGNENO: CPT

## 2025-06-17 PROCEDURE — 84443 ASSAY THYROID STIM HORMONE: CPT

## 2025-06-17 PROCEDURE — 82607 VITAMIN B-12: CPT

## 2025-06-17 PROCEDURE — 36415 COLL VENOUS BLD VENIPUNCTURE: CPT

## 2025-06-17 PROCEDURE — 82533 TOTAL CORTISOL: CPT

## 2025-06-17 PROCEDURE — 83001 ASSAY OF GONADOTROPIN (FSH): CPT

## 2025-06-17 PROCEDURE — 81001 URINALYSIS AUTO W/SCOPE: CPT

## 2025-06-17 PROCEDURE — 80053 COMPREHEN METABOLIC PANEL: CPT

## 2025-06-17 PROCEDURE — 80061 LIPID PANEL: CPT

## 2025-06-17 PROCEDURE — 85025 COMPLETE CBC W/AUTO DIFF WBC: CPT

## 2025-06-19 LAB
M TB IFN-G CD4+ T-CELLS BLD-ACNC: 0.04 IU/ML
M TB TUBERC IFN-G BLD QL: NEGATIVE
M TB TUBERC IGNF/MITOGEN IGNF CONTROL: >10 IU/ML
QFT TB1 AG MINUS NIL: 0.01 IU/ML
QFT TB2 AG MINUS NIL: 0.03 IU/ML

## 2025-06-20 ENCOUNTER — PATIENT MESSAGE (OUTPATIENT)
Dept: FAMILY MEDICINE CLINIC | Facility: CLINIC | Age: 25
End: 2025-06-20

## 2025-06-23 ENCOUNTER — TELEPHONE (OUTPATIENT)
Dept: FAMILY MEDICINE CLINIC | Facility: CLINIC | Age: 25
End: 2025-06-23

## 2025-06-23 LAB
17-OH PROGESTERONE: 38 NG/DL
SEX HORM BIND GLOB: 59.6 NMOL/L
TESTOST % FREE+WEAK BND: 11.7 %
TESTOST FREE+WEAK BND: 4.8 NG/DL
TESTOSTERONE TOT /MS: 41.4 NG/DL

## 2025-06-23 NOTE — TELEPHONE ENCOUNTER
Patient informed of Dr. Hernandez's note below. States that she will be out of town 7/15 to 7/30. Next available SDA is on 8/1. Patient scheduled and added on waitlist. She wants to know if it is possible for her to be seen sooner since she plans on weaning off the medication and not wait until August.

## 2025-06-23 NOTE — TELEPHONE ENCOUNTER
Patient is taking duloxetine since 2021. States that she is feeling much better and would like to know how she can start to wean off on this medication. Patient was getting the prescriptions from the student clinic in her college and now patient has graduated.

## 2025-06-23 NOTE — TELEPHONE ENCOUNTER
Please have the patient set up an appointment to discuss weaning of process and make a plan for her.  Thank you okay to use SDA.

## 2025-07-09 ENCOUNTER — TELEPHONE (OUTPATIENT)
Dept: PHYSICAL THERAPY | Facility: HOSPITAL | Age: 25
End: 2025-07-09

## 2025-07-10 ENCOUNTER — OFFICE VISIT (OUTPATIENT)
Dept: PHYSICAL THERAPY | Age: 25
End: 2025-07-10
Attending: NURSE PRACTITIONER
Payer: COMMERCIAL

## 2025-07-10 DIAGNOSIS — N94.10 PAIN IN FEMALE GENITALIA ON INTERCOURSE: ICD-10-CM

## 2025-07-10 DIAGNOSIS — N39.46 MIXED INCONTINENCE: Primary | ICD-10-CM

## 2025-07-10 PROCEDURE — 97140 MANUAL THERAPY 1/> REGIONS: CPT | Performed by: PHYSICAL THERAPIST

## 2025-07-10 PROCEDURE — 97163 PT EVAL HIGH COMPLEX 45 MIN: CPT | Performed by: PHYSICAL THERAPIST

## 2025-07-12 NOTE — PROGRESS NOTES
MUSCULOSKELETAL AND PELVIC FLOOR EVALUATION:     Diagnosis:   Mixed incontinence (N39.46)  Pain in female genitalia on intercourse (N94.10) Patient:  Usha Rouse (24 year old, female)        Referring Provider: Kristin Lang  Today's Date   2025    Precautions:  None   Date of Evaluation: 07/10/25  Next MD visit: No data recorded  Date of Surgery: No data recorded     PATIENT SUMMARY     Summary of chief complaints: urinary leakages, pain with sexual activity  History of current condition: She does not have a lot of control and have urinary leakages, and as she is washing her hands she will notice henrry dribbles,she also notices the urinary leakages with coughing, laughing and sneezing and with exercises,/ jumping,  she notices it also when she has her period. Also has pain when she puts her tampon in and with sexual activity. There is pain when she first inserts the tampon and with initial penetration with sexual adtivity and has been avoiding it. Denies abdominal pressure, LB and hip pain, denies n/t sensation on LE. Patient able to delay urinary urgencies. No burning pain with urination. Regular BM, everyday, not an issue. Very irregular monthly periods   Pain level: current 0 /10, at best 0 /10, at worst 5 /10  Description of symptoms: Burning kind of pain more towrd the back.   Occupation: Teacher   Leisure activities/Hobbies: Exercises- cardio, lift weight, pilates, full body   Prior level of function: Independent with ADLs  Current limitations: sexual activity, impiared bladder mechanics  Pt goals: Get rid of the pain with sexual activty and decrease urinary leakages  Pregnant Now: No   OB History    Para Term  AB Living   0 0 0 0 0 0   SAB IAB Ectopic Multiple Live Births   0 0 0 0 0     Urodynamic Test:     Manometry:     PFDI 20    Scores   POPDI 6:    4.17   CRAD 8:    25   PENNY 6:    62.5   Summary:    91.67      Outpatient Therapy Pelvic Health Intake       Question 2025   9:09 AM CDT - Filed by Patient    Do you usually experience pressure in the lower abdomen? Not present    Do you usually experience heaviness or dullness in the pelvic area? Not present    Do you usually have a bulge or something falling out that you can see or feel in your vaginal area? Not present    Do you ever have to push on the vagina or around the rectum to have or complete a bowel movement? Not present    Do you usually experience a feeling of incomplete bladder emptying? Not at all    Do you ever have to push up on a bulge in the vaginal area with your fingers to start or complete urination? Not present    Please provide details on the following urinary history / complaints     Frequency of urination: # of times/day 5    Frequency of urination: # of times/night 0    When you have the urge to urinate, how long can you delay before you have to go to the toilet? (# of minutes or hours) 1 hour    Do you have a history of urinary tract infections: No    Do you have a history of urine loss (select all that apply)       How many times a day does leakage occur? 2    If you have leakage, what type(s) of protective device(s) do you use? (Select all that apply) None     Panty liner    On average, how many pads do you use each day? 0    Do you soak the pad fully? No    Do you change the pad each time it is wet? No    Do you experience any of these symptoms? (Select all that apply) Dribble after you empty your bladder    Do you usually experience frequent urination? Not at all    Do you usually experience urine leakage associated with a feeling of urgency, that is, a strong sensation of needing to go to the bathroom? Not at all    Do you usually experience urine leakage related to coughing, sneezing, or laughing? Quite a bit    Do you usually experience small amounts of urine leakage (that is, drops)? Quite a bit    Do you usually experience difficulty empyting your bladder? Not at all    Do you usually experience pain  or discomfort in the lower abdomen or genital region? Quite a bit    Have you ever had any of the following treatment:     Have you ever done exercises to control urine loss? If so, for how long? No    Has your doctor ever prescribed any medication for urine loss? No    Have you had any surgical procedures to treat urine loss? No    Please provide details on the following bowel history / complaints.     How many bowel movements do you have per day? 1    How many bowel movements do you have per night? 0    Do you experience diarrhea? If yes, how often? No    Do you feel you need to strain too hard to have a bowel movement? Not at all    Do you feel you have not completely emptied your bowels at the end of a bowel movement? Not at all    Do you usually lose stool beyond your control if your stool is well formed? Not at all    Do you usually lose stool beyond your control if your stool is loose? Not at all    Do you usually lose gas from the rectum beyond your control? Not at all    Do you usually have pain when you pass your stool? Not at all    Do you experience strong sense of urgency and have to rush to bathroom for bowel movement? Not at all    Does part of bowel ever pass through rectum and bulge outside during/after bowel movement? Not at all    Please provide details on your daily fluid/food intake.     On average, what is your daily fluid intake (1 glass=8ounces)? (# of glasses per day) 92    How many are caffeinated? (# of glasses per day) 1    Do you restrict fluids because of incontenence (leakage)? No    Do you include fiber in your diet (fruits, vegetables, bran, etc.)? Yes    Psychosocial information     Do you live alone? No    Which recreational activities do you participate in if any?       Have you had to restrict your activities due to your pelvic health concerns? No    On a scale of 0 (no impairments) to 10 (severe impairments), what are your feelings about your urinary or bowel incontinence or  pelvic pain? 4    Do you have pain with intercourse? Yes    Have you had any changes in intimate relationships/sexual function due to your symptoms?  Yes    Your personal safety is of utmost importance to us at [facility], please answer the following questions:     Are you being hurt, frightened, demeaned, or taken advantage of by anyone at your home or in your life?  No    Have you recently had thoughts of hurting yourself? No    Have you tried to hurt yourself in the past?  No    Pelvic Organ Prolapse Distress Inventory 6 Score (range: 0 - 100) 4.17    Colorectal-Anal Distress Inventory 8 Score (range: 0 - 100) 25    Urinary Distress Inventory 6 Score (range: 0 - 100) 62.5    PFDI Summary Score (range: 0 - 300) 91.67            Sexual Health Status  Marinoff Scale: 3   History of sexual abuse: No   Sexual intercourse status: No, because of the pain     Past medical history was reviewed with Usha.  Significant findings include:    Imaging/Tests: [facility]  has a past medical history of Allergic rhinitis (8/1/17), Anemia, Anxiety (8/1/18), Asthma (HCC) (8/1/08), Depression (8/1/18), Esophageal reflux, motion sickness, Migraines, and Pneumonia due to organism (2010).  She  has a past surgical history that includes wisdom teeth removed and dental surgery procedure.    ASSESSMENT  Usha presents to physical therapy evaluation with primary c/o urinary leakages, pain with sexual activity. The results of the objective tests and measures show postural deviation, poor abdominal wall integrity, weakness of abdominal muscles, poor core muscles strength, decreased muscles flexibility, decreased lumbopelvic stability,severe PF muscles tightness, decreased PF soft tissues mobility. Functional deficits include but are not limited to sexual activity, impiared bladder mechanics. Signs and symptoms are consistent with diagnosis of Mixed incontinence (N39.46)  Pain in female genitalia on intercourse  (N94.10). Pt and PT discussed evaluation findings, pathology, POC and HEP.  Pt voiced understanding and performs HEP correctly without reported pain. Skilled Physical Therapy is medically necessary to address the above impairments and reach functional goals.    OBJECTIVE:      Musculoskeletal  Posture: Posture: FHP, increased lumbar lordosis   Pelvic Alignment: WNL   External Palpation: Non tender   Scars (location/surgery): None  Abdominal Wall Integrity: Poor   Diastasis Recti: negative (umbilicus 1 FB)         Special Tests: ASLR - poor lumbar loading transfers L  Informed consent for internal pelvic evaluation given:Yes     External Observation:   Voluntary contraction: present   Voluntary relaxation: present   Involuntary contraction: absent   Involuntary relaxation: absent   Mons pubis: WNL   Labia majora: WNL  Labia minora: WNL   Urethral meatus: WNL   Introitus: WNL   Perineal body: WNL  Anus/External Anal Sphincter: NT    Internal Examination   Scar:      Pelvic Floor Muscle strength: (PERF= Power/Endurance/Reps/Fast) MMT:    Power Endurance REPS Fast   -- (Unable to test due to severe pain)           External Anal Sphincter:     Accessory Muscle Use:       Tissue Laxity Test:  Anterior Wall: NT  Posterior Wall: NT   Apical: NT     Eccentric lengthening contraction:     Bearing down Valsalva Maneuver (2-3x):       Internal Palpation:      Superficial Transverse Perineal  severe restriction; pain; tenderness   Bulbospongiosus severe restriction; pain; tenderness   Ischiocavernosus not tested   Deep Transverse Perineal not tested   Compress Urethra/Sphincter not tested   Urethrovaginalis not tested   Pubococcygeus not tested   Iliococcygeus not tested   Coccygeus not tested   Piriformis (palpated externally)  -- (WNL)   Obturator Internus      Pelvic Clock       ROM and Strength  (* denotes performed with pain)  Trunk ROM MMT (-/5)     Flex WNL 3/5 (Transversus abdominis -2/5)     Ext WNL      R L R L      Side bend WNL WNL         Rotation           ,   Hip   ROM MMT (-/5)    R L R L     Flex (L2)     5/5 5/5     Ext      5/5 5/5     Abd     5/5 5/5     ER WNL WNL         IR                Flexibility:    LE Flexibility R L     Hip Flexor min restricted mod restricted     Hamstrings min restricted mod restricted     ITB WNL WNL     Piriformis WNL WNL     Quads WNL WNL     Gastroc-soleus min restricted min restricted     Adductors min restricted mod restricted     LE Flexibility Other R L              Neurological  Deep Tendon Reflexes:    Patella: R:  , L:  ;          Achilles: R:  , L:     Sensory/Reflex:  Vestibule:     Anal [place]:     Cough Reflex:       Balance and Functional Mobility  Gait: pt ambulates on level ground with  .  SLS EO: R      L          Today's Treatment and Response:   Pt education was provided on exam findings, treatment diagnosis, treatment plan, expectations, and prognosis.    Today's Treatment       7/10/2025   Pelvic Treatment   Therapeutic Exercise --   Manual Therapy Perineal stretching and STM    Manual Therapy Minutes 8   Evaluation Minutes 40   Total Time Of Timed Procedures 8   Total Time Of Service-Based Procedures 40   Total Treatment Time 48   HEP Discussed psychosocial aspects of pelvic pain and utility of down-training PFM via diaphragmatic breathing, stretching exercise, and internal treatments/dilator/wand use.   Today's Treatment and Response:   Patient education was provided on objective findings of external and internal evaluation and expectations with treatment outcomes. Educated on pelvic anatomy and function with diagrams and pelvic model, bladder normatives, and adequate hydration levels         Patient was instructed in and issued a HEP for: Discussed psychosocial aspects of pelvic pain and utility of down-training PFM via diaphragmatic breathing, stretching exercise, and internal treatments/dilator/wand use.   Today's Treatment and Response:   Patient education was  provided on objective findings of external and internal evaluation and expectations with treatment outcomes. Educated on pelvic anatomy and function with diagrams and pelvic model, bladder normatives, and adequate hydration levels     Charges:  PT EVAL: High Complexity, Man therapy-1  In agreement with evaluation findings and clinical rationale, this evaluation involved HIGH COMPLEXITY decision making due to 3 or more personal factors/comorbidities, 4 or more body structures involved/activity limitations, and unstable symptoms as documented in the evaluation.                                                                       PLAN OF CARE:      Goals: (to be met in 10 visits)     Not Met Progress  Toward Partially  Met Met   Patient will have increased awareness for PF muscles contractions and relaxation to improve ability to promote relaxation and decrease pain by 50% (4 visits, and 80-90% 1 (10 visits) during internal PF assessment [] [] [] []   Patient will demonstrate decreased PF muscles tightness, decreased STRs to mild, to facilitate soft tissues mobility and allow PF muscles to relax and accommodate penile tissue during sexual activity, instrument during PF examination and digital PF assessment. [] [] [] []   Patient will exhibit improvement of hips, core, abdominal muscles strength to  5/5 to improve lumbar stability, improve ability of patient to perform daily and work related activities with no apprehension for pain or difficulty. [] [] [] []   Patient will have Marinoff score of 0-1.  [] [] [] []   Patient will demonstrate improved coordination of core and PF muscles, including proper respiration to facilitate bowel, bladder, sexual functions and minimize symptoms. [] [] [] []   Patient will verbalize understanding of PF functions, knowledge of normal bowel, bladder sexual mechanics, and utilize an established HEP to manage symptoms [] [] [] []      Frequency / Duration: Patient will be seen 1x/week or  a total of 10  visits over a 90 day period. Treatment will include: Manual Therapy; Neuromuscular Re-education; Therapeutic Activities; Self-Care Home Management; Therapeutic Exercise; Home Exercise Program instruction; Electrical stimulation (unattended); Patient/Family Education (stretching, diaphragmatic breathing, core activation, progress to PFMT)    Education or treatment limitation: None   Rehab Potential: good         Patient/Family/Caregiver was advised of these findings, precautions, and treatment options and has agreed to actively participate in planning and for this course of care.    Thank you for your referral. Please co-sign or sign and return this letter via fax as soon as possible to [phone]. If you have any questions, please contact me at Dept: [phone]    Sincerely,  Electronically signed by therapist: Sandra Childress PT  Physician's certification required: Yes  I certify the need for these services furnished under this plan of treatment and while under my care.    X___________________________________________________ Date____________________    Certification From: 7/11/2025  To: 10/9/2025

## 2025-07-15 ENCOUNTER — OFFICE VISIT (OUTPATIENT)
Dept: PHYSICAL THERAPY | Age: 25
End: 2025-07-15
Attending: NURSE PRACTITIONER
Payer: COMMERCIAL

## 2025-07-15 PROCEDURE — 97112 NEUROMUSCULAR REEDUCATION: CPT | Performed by: PHYSICAL THERAPIST

## 2025-07-15 PROCEDURE — 97110 THERAPEUTIC EXERCISES: CPT | Performed by: PHYSICAL THERAPIST

## 2025-07-15 NOTE — PROGRESS NOTES
Patient: Usha Rouse (24 year old, female) Referring Provider:  Insurance:   Diagnosis: Mixed incontinence (N39.46)  Pain in female genitalia on intercourse (N94.10) Kristin Lang  [facility]   Date of Surgery: No data recorded Next MD visit:  N/A   Precautions:  None No data recorded Referral Information:    Date of Evaluation: Req: 10, Auth: 10, Exp: 10/31/2025    07/10/25 POC Auth Visits:           Today's Date   7/15/2025    Subjective  Has urinary dribbles after urination, always. Tight pelvic floor muscles. She reports that she has been trying to be more aware of her muscles and she noticed that she has been tightnening up a lot, she does not realize how much she does it.       Pain: 0/10     Objective  see flowsheet for details. Tightness on hamstrings            Assessment  Initiated therapeutic exercises today. Treatment time includes explanation of intended effect of each intervention, instruction before and during exercises, including cues for proper form and performance, assessment of patient's response to each activity and education to improve performance of active intervention and good comprehension of treatment plan to improve patient participation and compliance. Intervention adjusted to patient's tolerance throughout session duration. Avoid adductors strengthening at this time.    Goals (to be met in 10 visits)       Not Met Progress  Toward Partially  Met Met   Patient will have increased awareness for PF muscles contractions and relaxation to improve ability to promote relaxation and decrease pain by 50% (4 visits, and 80-90% 1 (10 visits) during internal PF assessment []  []  []  []    Patient will demonstrate decreased PF muscles tightness, decreased STRs to mild, to facilitate soft tissues mobility and allow PF muscles to relax and accommodate penile tissue during sexual activity, instrument during PF examination and digital PF assessment. []  []  []  []    Patient will exhibit improvement  of hips, core, abdominal muscles strength to  5/5 to improve lumbar stability, improve ability of patient to perform daily and work related activities with no apprehension for pain or difficulty. []  []  []  []    Patient will have Marinoff score of 0-1.  []  []  []  []    Patient will demonstrate improved coordination of core and PF muscles, including proper respiration to facilitate bowel, bladder, sexual functions and minimize symptoms. []  []  []  []    Patient will verbalize understanding of PF functions, knowledge of normal bowel, bladder sexual mechanics, and utilize an established HEP to manage symptoms []  []  []  []          Plan  Continue PT as per established POC.Internal on 4th/5th visit    Treatment Last 4 Visits  Treatment Day: 2       7/10/2025 7/15/2025   Pelvic Treatment   Therapeutic Exercise -- Nu-step, L3 , 5 mins  Calf stretches on incline 2x 30 secs  Adductor stretches 2x 30 secs  Hamstrings stretches with strap 2x 30 secs  Piriformis stretches 2x 30 secs (fig 4 with lift)  Modified David stretches 2x 30 secs  Happy baby x 2x 30     Neuro Re-Education  Balance board a/p, lateral x 2 mins each  Quadruped a/p, lateral pelvic tilts x 10 20  DBE in quadruped, awareness for abdominal movements  DBE with TA activation to increase awareness for breathing, keeping the spine neutral and movement of abdominal muscles  Diaphragmatic breathing in supine and sitting.   Manual Therapy Perineal stretching and STM  --   Therapeutic Exercise Minutes  25   Neuro Re-Educ Minutes  15   Manual Therapy Minutes 8    Evaluation Minutes 40    Total Time Of Timed Procedures 8 40   Total Time Of Service-Based Procedures 40 0   Total Treatment Time 48 40   HEP Discussed psychosocial aspects of pelvic pain and utility of down-training PFM via diaphragmatic breathing, stretching exercise, and internal treatments/dilator/wand use.   Today's Treatment and Response:   Patient education was provided on objective findings of  external and internal evaluation and expectations with treatment outcomes. Educated on pelvic anatomy and function with diagrams and pelvic model, bladder normatives, and adequate hydration levels  Access Code: [email or web address]  URL: [email or web address]  Date: 07/15/2025  Prepared by: Sandra Childress    Exercises  - Gastroc Stretch on Wall  - 1 x daily - 7 x weekly - 1 sets - 3 reps - 30 hold  - Supine Hamstring Stretch with Strap  - 1 x daily - 7 x weekly - 3 sets - 3 reps  - Supine Butterfly Groin Stretch  - 1 x daily - 7 x weekly - 1 sets - 3 reps - 30 hold  - Supine Figure 4 Piriformis Stretch  - 1 x daily - 7 x weekly - 1- sets - 3 reps - 30 hold  - Modified David Stretch  - 1 x daily - 7 x weekly - 1 sets - 3 reps - 30 hold  - Happy Baby Stretch  - 2 x daily - 7 x weekly - 1 sets - 3 reps - 30 hold  - Supine Diaphragmatic Breathing  - 1 x daily - 7 x weekly - 1 sets - 10 reps - 4 hold - 4 secs relaxation - 4 secs rest  - Seated Diaphragmatic Breathing  - 2 x daily - 7 x weekly - 1 sets - 10 reps - 4 hold - 4 secs release  - 4 secs rest        HEP  Access Code: [email or web address]  URL: [email or web address]  Date: 07/15/2025  Prepared by: Sandra Childress    Exercises  - Gastroc Stretch on Wall  - 1 x daily - 7 x weekly - 1 sets - 3 reps - 30 hold  - Supine Hamstring Stretch with Strap  - 1 x daily - 7 x weekly - 3 sets - 3 reps  - Supine Butterfly Groin Stretch  - 1 x daily - 7 x weekly - 1 sets - 3 reps - 30 hold  - Supine Figure 4 Piriformis Stretch  - 1 x daily - 7 x weekly - 1- sets - 3 reps - 30 hold  - Modified David Stretch  - 1 x daily - 7 x weekly - 1 sets - 3 reps - 30 hold  - Happy Baby Stretch  - 2 x daily - 7 x weekly - 1 sets - 3 reps - 30 hold  - Supine Diaphragmatic Breathing  - 1 x daily - 7 x weekly - 1 sets - 10 reps - 4 hold - 4 secs relaxation - 4 secs rest  - Seated Diaphragmatic Breathing  - 2 x daily - 7 x weekly - 1 sets - 10 reps - 4 hold - 4 secs release  - 4 secs  rest    Charges  Thera ex -2, NMR -1

## 2025-07-22 ENCOUNTER — TELEPHONE (OUTPATIENT)
Age: 25
End: 2025-07-22

## 2025-07-22 NOTE — TELEPHONE ENCOUNTER
[facility]  [address] [facility] [address]  Phone: [phone]  [email or web address]     [facility]  [address]  [address]  [facility] [facility] [address]  Phone: [phone]  [email or web address]     [facility]  [address]  [address]  [place] [address]  Phone: [phone]  [email or web address]     [facility]  [address] [address]  Phone: [phone]  [email or web address]     [facility]  [address] [address]  Phone: [phone]  [email or web address]

## 2025-07-24 ENCOUNTER — APPOINTMENT (OUTPATIENT)
Dept: PHYSICAL THERAPY | Age: 25
End: 2025-07-24
Attending: NURSE PRACTITIONER
Payer: COMMERCIAL

## 2025-07-29 ENCOUNTER — OFFICE VISIT (OUTPATIENT)
Dept: PHYSICAL THERAPY | Age: 25
End: 2025-07-29
Attending: NURSE PRACTITIONER
Payer: COMMERCIAL

## 2025-07-29 PROCEDURE — 97110 THERAPEUTIC EXERCISES: CPT | Performed by: PHYSICAL THERAPIST

## 2025-07-29 PROCEDURE — 97112 NEUROMUSCULAR REEDUCATION: CPT | Performed by: PHYSICAL THERAPIST

## 2025-08-05 ENCOUNTER — APPOINTMENT (OUTPATIENT)
Dept: PHYSICAL THERAPY | Age: 25
End: 2025-08-05
Attending: NURSE PRACTITIONER

## 2025-08-07 ENCOUNTER — OFFICE VISIT (OUTPATIENT)
Dept: OBGYN CLINIC | Facility: CLINIC | Age: 25
End: 2025-08-07

## 2025-08-07 VITALS
DIASTOLIC BLOOD PRESSURE: 60 MMHG | WEIGHT: 160 LBS | SYSTOLIC BLOOD PRESSURE: 100 MMHG | HEIGHT: 65 IN | BODY MASS INDEX: 26.66 KG/M2 | HEART RATE: 71 BPM

## 2025-08-07 DIAGNOSIS — N92.6 IRREGULAR MENSES: Primary | ICD-10-CM

## 2025-08-07 PROCEDURE — 3074F SYST BP LT 130 MM HG: CPT | Performed by: NURSE PRACTITIONER

## 2025-08-07 PROCEDURE — 3008F BODY MASS INDEX DOCD: CPT | Performed by: NURSE PRACTITIONER

## 2025-08-07 PROCEDURE — 3078F DIAST BP <80 MM HG: CPT | Performed by: NURSE PRACTITIONER

## 2025-08-07 PROCEDURE — 99214 OFFICE O/P EST MOD 30 MIN: CPT | Performed by: NURSE PRACTITIONER

## 2025-08-12 ENCOUNTER — OFFICE VISIT (OUTPATIENT)
Dept: PHYSICAL THERAPY | Age: 25
End: 2025-08-12
Attending: NURSE PRACTITIONER

## 2025-08-12 PROCEDURE — 97112 NEUROMUSCULAR REEDUCATION: CPT | Performed by: PHYSICAL THERAPIST

## 2025-08-12 PROCEDURE — 97110 THERAPEUTIC EXERCISES: CPT | Performed by: PHYSICAL THERAPIST

## 2025-08-16 ENCOUNTER — PATIENT MESSAGE (OUTPATIENT)
Dept: FAMILY MEDICINE CLINIC | Facility: CLINIC | Age: 25
End: 2025-08-16

## 2025-08-19 ENCOUNTER — OFFICE VISIT (OUTPATIENT)
Dept: PHYSICAL THERAPY | Age: 25
End: 2025-08-19
Attending: NURSE PRACTITIONER

## 2025-08-19 PROCEDURE — 97112 NEUROMUSCULAR REEDUCATION: CPT | Performed by: PHYSICAL THERAPIST

## 2025-08-19 PROCEDURE — 97110 THERAPEUTIC EXERCISES: CPT | Performed by: PHYSICAL THERAPIST

## 2025-08-20 ENCOUNTER — MED REC SCAN ONLY (OUTPATIENT)
Dept: FAMILY MEDICINE CLINIC | Facility: CLINIC | Age: 25
End: 2025-08-20

## 2025-08-26 ENCOUNTER — OFFICE VISIT (OUTPATIENT)
Dept: PHYSICAL THERAPY | Age: 25
End: 2025-08-26
Attending: NURSE PRACTITIONER

## 2025-08-26 PROCEDURE — 97112 NEUROMUSCULAR REEDUCATION: CPT | Performed by: PHYSICAL THERAPIST

## 2025-08-26 PROCEDURE — 97110 THERAPEUTIC EXERCISES: CPT | Performed by: PHYSICAL THERAPIST

## (undated) DEVICE — CATHETER URETH 10FR INTMIT RED RUB

## (undated) DEVICE — PACK TANDA

## (undated) DEVICE — KIT,ANTI FOG,W/SPONGE & FLUID,SOFT PACK: Brand: MEDLINE

## (undated) DEVICE — PROCISE XP WAND: Brand: COBLATION

## (undated) DEVICE — SOLUTION IRRIG 1000ML 0.9% NACL USP BTL

## (undated) DEVICE — SUCTION COAGULATOR: Brand: VALLEYLAB

## (undated) DEVICE — INSULATED BLADE ELECTRODE: Brand: EDGE

## (undated) DEVICE — GLOVE SUR 7.5 SENSICARE PI PIP CRM PWD F

## (undated) DEVICE — NEPTUNE E-SEP SMOKE EVACUATION PENCIL, COATED, 70MM BLADE, PUSH BUTTON SWITCH: Brand: NEPTUNE E-SEP

## (undated) NOTE — MR AVS SNAPSHOT
After Visit Summary   6/14/2024    Usha Rouse   MRN: [id number]           Visit Information     Date & Time  6/14/2024  2:30 PM Provider  Kristin Lang APRN Department  [facility], [address] [place] [address] - OB/GYN Dept. Phone  [phone]      Your Vitals Were  Most recent update: 6/14/2024  2:40 PM    BP   122/78          Pulse   93          Ht   65\"          Wt   162 lb 8 oz          LMP   05/15/2024 (Exact Date)             BMI   27.04 kg/m²         Allergies as of 6/14/2024  Review status set to Review Complete on 6/14/2024   No Known Allergies     Your Current Medications        Dosage    Drospirenone-Ethinyl Estradiol (FREDDIE) 3-0.02 MG Oral Tab Take 1 tablet by mouth daily.    Doxycycline Hyclate 20 MG Oral Tab Take 1.5 tablets (30 mg total) by mouth daily as needed.    doxycycline in sodium chloride 0.9% 500 mg/50mL Intrapleural Solution     busPIRone 5 MG Oral Tab Take 1 tablet (5 mg total) by mouth every 12 (twelve) hours as needed.    DULoxetine 30 MG Oral Cap DR Particles Take 1 capsule (30 mg total) by mouth daily.      Diagnoses for This Visit    Well woman exam with routine gynecological exam   [458665]  -  Primary  Screening for cervical cancer   [851160]    Irregular menses   [995347]    Initiation of oral contraception   [8465886]             Follow-up    Return in about 6 months (around 12/14/2024) for medication management.     We Ordered the Following     Normal Orders This Visit    Image-Guided Pap Smear ([facility]) [DVL6495 CPT(R)]     ThinPrep PAP with HPV Reflex Request B [[id number] CUSTOM]     Urine Preg Test [52971] [85719 CPT(R)]     Future Labs/Procedures Expected by Expires    ThinPrep PAP with HPV Reflex Request B [[id number] CUSTOM]  6/14/2024 6/14/2025      Follow-up Instructions    Return in about 6 months (around 12/14/2024) for medication management.        Instructions    Instructions for Birth Control Pill Use    The birth control pill works primarily  by blocking ovulation (release of an egg).  If there is no egg to meet the sperm, pregnancy cannot occur.  The pill also works by making cervical mucous thick and unreceptive to sperm, slowing tubal function which has to move the egg down the tube to meet the sperm.    For women who follow these directions carefully, the pill is an effective reversible contraceptive currently available.    Starting birth control pills for the first time  1). Choose a backup method of birth control (such as condoms, diaphragm or foam) to use with your first pack of pills because the pill may not fully protect you from pregnancy during the first two weeks.  Keep this backup method handy and use it in case you:  Run out of pills  Forget to take your pill  Discontinue pill use  The use of condoms is ALWAYS encouraged to protect against sexually transmitted diseases, if applicable.   2). There are several ways to start taking your pills. Use one of the following approaches:  First day of period start: Start your first pack of pills on the day of your period. No back-up method is required  Sunday start: Start your first pack of pills on the first Sunday after your period begins.  This will result in your menses almost always beginning on a Tuesday or a Wednesday every 4 weeks.  You will need a backup method for 14 days.  Quick Start: Start immediately if pregnancy is excluded. You will need a back-up method for 14 days.  Quick start will cause your period to be irregular.  3). Take one pill a day until you finish the pack.   If you are using a 28-day pack, begin a new pack immediately. Skip no days between packages  If you are using a 21-day pack, stop taking pills for 1 week and then start your new pack   4). Try to associate taking your pill with something you do around the same time every day, like brushing your teeth in the morning, eating a meal or going to bed.  Establishing a routine will make it easier for you to remember. The  pill works best if you take it about the same time every day.    Continuing on the pills ~ What if……  1). If you have bleeding between periods  This is a very common side effect of birth control pills for the first 3 months.  Spotting (light bleeding between periods) can also occur if you miss a pill.  Call the doctor’s office for advice if bleeding is heavier than 1 pad an hour or the bleeding between periods last for more than 3 months  2). If you have nausea or vomiting  Nausea and vomiting may occur during the first few months of taking birth control pills.  Sometimes by changing the time of the day when you take the pill will help.  Try switching to dinner time or before bed.  If you had episodes of vomiting within 2 hours of taking your pill, please use a back-up plan for the rest of the cycle because your body may not absorbed the pill.  Contact your doctor’s office if you have persistent nausea and vomiting.  3). If you miss your period  It is not uncommon for your periods to become lighter while taking birth control pills or miss you period all together.  If you missed any pills in the pack prior to this occurring, you should take a home pregnancy test prior to starting a new pack.  4). If you forget your pills for a day or two follow the instructions below:  If you miss a pill, take the forgotten one (yesterday’s pill) as soon as you remember it and take today’s pill at the regular time.  Although you probably will not become pregnant, use a back-up method until your next period.  If you miss two pills in a row, take two pills as soon as you remember and two pills the next day.  You may have some spotting and nausea.  Please use a back-up method until your next period.  If you miss three or more pills in a row, do not take all 3 pills at once.  If you are in the third week of pills, finish the pack and skip the inactive pills and start a new pack.  Start your backup method of birth control immediately.   You are at risk for becoming pregnant if you do not use a backup contraception.    Remember, missed pills may cause you to start spotting or bleeding for about 7 days.    5). If you experience breast soreness, mild headaches, mild edema (swelling)  These symptoms are usually mild and will improve after being on the pill for 3 or more months.  If any of these symptoms are severe or persist more than 3 months, you should contact our office.  6). If you experience mood swings or changes  Usually, after you adjust to the hormones, these symptoms will improve.  If at any time these symptoms are severe or persistent, you should contact our office.    7). If your doctor has you on continuous pills (No 7 day break after 21 days of active pills) in order to suppress your menses because of endometriosis or premenstrual syndrome, you will likely have break through bleeding.  If the spotting persists through more than 3 packs of pills, contact your doctor to confirm that you should stay on that brand of pills    8). If you need to take any other medications, including antibiotics, check with the pharmacist to see if there will be any interaction or if it will make your birth control pill less effective.      When to contact your provider  If you are having severe abdominal pain  Chest pain and shortness of breath  Severe Headaches  Blurred Vision or Vision Loss  Severe leg pain- calf or thigh    If you have additional questions or concerns, please call us at [phone]                     Did you know that [facility] [place] now offer Video Visits through [employer] for adult patients for a variety of conditions such as allergies, back pain and cold symptoms? Skip the drive and waiting room and online chat with a doctor face-to-face using your web-cam enabled computer or mobile device wherever you are. Video Visits cost $50 and can be paid hassle-free using a credit, debit, or health savings card.  Not active on  [employer]? Ask us how to get signed up today!          If you receive a survey from [facility]rell[facility], please take a few minutes to complete it and provide feedback. We strive to deliver the best patient experience and are looking for ways to make improvements. Your feedback will help us do so. For more information on [facility]rell[facility], please visit [email or web address]           No text in SmartText           No text in SmartText